# Patient Record
Sex: FEMALE | Race: WHITE | NOT HISPANIC OR LATINO | URBAN - METROPOLITAN AREA
[De-identification: names, ages, dates, MRNs, and addresses within clinical notes are randomized per-mention and may not be internally consistent; named-entity substitution may affect disease eponyms.]

---

## 2017-05-16 ENCOUNTER — ALLSCRIPTS OFFICE VISIT (OUTPATIENT)
Dept: OTHER | Facility: OTHER | Age: 27
End: 2017-05-16

## 2017-05-16 PROCEDURE — G0145 SCR C/V CYTO,THINLAYER,RESCR: HCPCS | Performed by: OBSTETRICS & GYNECOLOGY

## 2017-05-17 ENCOUNTER — LAB REQUISITION (OUTPATIENT)
Dept: LAB | Facility: HOSPITAL | Age: 27
End: 2017-05-17
Payer: COMMERCIAL

## 2017-05-17 DIAGNOSIS — Z01.419 ENCOUNTER FOR GYNECOLOGICAL EXAMINATION WITHOUT ABNORMAL FINDING: ICD-10-CM

## 2017-05-22 LAB
LAB AP GYN PRIMARY INTERPRETATION: NORMAL
LAB AP LMP: NORMAL
Lab: NORMAL

## 2018-01-13 VITALS
DIASTOLIC BLOOD PRESSURE: 78 MMHG | HEIGHT: 65 IN | WEIGHT: 195.13 LBS | BODY MASS INDEX: 32.51 KG/M2 | SYSTOLIC BLOOD PRESSURE: 112 MMHG

## 2018-05-17 DIAGNOSIS — Z30.41 ENCOUNTER FOR SURVEILLANCE OF CONTRACEPTIVE PILLS: Primary | ICD-10-CM

## 2018-05-17 RX ORDER — DROSPIRENONE AND ETHINYL ESTRADIOL 0.02-3(28)
1 KIT ORAL DAILY
COMMUNITY
Start: 2014-05-05 | End: 2018-05-17 | Stop reason: SDUPTHER

## 2018-05-18 RX ORDER — DROSPIRENONE AND ETHINYL ESTRADIOL 0.02-3(28)
1 KIT ORAL DAILY
Qty: 28 TABLET | Refills: 0 | Status: SHIPPED | OUTPATIENT
Start: 2018-05-18 | End: 2018-06-14 | Stop reason: SDUPTHER

## 2018-06-14 ENCOUNTER — ANNUAL EXAM (OUTPATIENT)
Dept: GYNECOLOGY | Facility: CLINIC | Age: 28
End: 2018-06-14
Payer: COMMERCIAL

## 2018-06-14 VITALS
WEIGHT: 192 LBS | HEIGHT: 64 IN | DIASTOLIC BLOOD PRESSURE: 78 MMHG | BODY MASS INDEX: 32.78 KG/M2 | SYSTOLIC BLOOD PRESSURE: 120 MMHG

## 2018-06-14 DIAGNOSIS — Z30.41 ENCOUNTER FOR SURVEILLANCE OF CONTRACEPTIVE PILLS: ICD-10-CM

## 2018-06-14 DIAGNOSIS — Z12.4 ENCOUNTER FOR PAPANICOLAOU SMEAR FOR CERVICAL CANCER SCREENING: ICD-10-CM

## 2018-06-14 DIAGNOSIS — Z01.419 ENCOUNTER FOR GYNECOLOGICAL EXAMINATION WITHOUT ABNORMAL FINDING: Primary | ICD-10-CM

## 2018-06-14 PROCEDURE — G0145 SCR C/V CYTO,THINLAYER,RESCR: HCPCS | Performed by: OBSTETRICS & GYNECOLOGY

## 2018-06-14 PROCEDURE — 99395 PREV VISIT EST AGE 18-39: CPT | Performed by: OBSTETRICS & GYNECOLOGY

## 2018-06-14 RX ORDER — DROSPIRENONE AND ETHINYL ESTRADIOL 0.02-3(28)
1 KIT ORAL DAILY
Qty: 84 TABLET | Refills: 3 | Status: SHIPPED | OUTPATIENT
Start: 2018-06-14 | End: 2018-08-22 | Stop reason: SDUPTHER

## 2018-06-14 NOTE — PROGRESS NOTES
Assessment/Plan:    Normal gyn exam     Diagnoses and all orders for this visit:    Encounter for gynecological examination without abnormal finding    Encounter for surveillance of contraceptive pills  -     drospirenone-ethinyl estradiol (Nicholas Hem) 3-0 02 MG per tablet; Take 1 tablet by mouth daily for 84 days        Subjective:      Patient ID: Ramesh Garcia is a 32 y o  female  HPI    The following portions of the patient's history were reviewed and updated as appropriate: allergies, current medications, past family history, past medical history, past social history, past surgical history and problem list     Review of Systems   Constitutional: Negative  Gastrointestinal: Negative  Genitourinary: Negative  Objective:      /78 (BP Location: Right arm)   Ht 5' 3 98" (1 625 m)   Wt 87 1 kg (192 lb)   LMP 05/20/2018 (Approximate)   BMI 32 98 kg/m²          Physical Exam   Constitutional: She appears well-developed and well-nourished  Neck: Normal range of motion  Neck supple  No thyromegaly present  Cardiovascular: Normal rate, regular rhythm and normal heart sounds  Pulmonary/Chest: Effort normal and breath sounds normal  Right breast exhibits no inverted nipple, no mass, no nipple discharge, no skin change and no tenderness  Left breast exhibits no inverted nipple, no mass, no nipple discharge, no skin change and no tenderness  Abdominal: Soft  Bowel sounds are normal  She exhibits no distension and no mass  There is no tenderness  Hernia confirmed negative in the right inguinal area and confirmed negative in the left inguinal area  Genitourinary: Vagina normal  There is no rash or lesion on the right labia  There is no rash or lesion on the left labia  Uterus is not deviated, not enlarged, not fixed and not tender  Cervix exhibits no motion tenderness, no discharge and no friability  Right adnexum displays no mass, no tenderness and no fullness   Left adnexum displays no mass, no tenderness and no fullness  Lymphadenopathy:        Right: No inguinal adenopathy present  Left: No inguinal adenopathy present

## 2018-06-19 LAB
LAB AP GYN PRIMARY INTERPRETATION: NORMAL
Lab: NORMAL

## 2018-08-22 ENCOUNTER — TELEPHONE (OUTPATIENT)
Dept: GYNECOLOGY | Facility: CLINIC | Age: 28
End: 2018-08-22

## 2018-08-22 DIAGNOSIS — Z30.41 ENCOUNTER FOR SURVEILLANCE OF CONTRACEPTIVE PILLS: ICD-10-CM

## 2018-08-22 RX ORDER — DROSPIRENONE AND ETHINYL ESTRADIOL 0.02-3(28)
1 KIT ORAL DAILY
Qty: 84 TABLET | Refills: 0 | Status: SHIPPED | OUTPATIENT
Start: 2018-08-22 | End: 2019-05-24 | Stop reason: SDUPTHER

## 2019-05-24 DIAGNOSIS — Z30.41 ENCOUNTER FOR SURVEILLANCE OF CONTRACEPTIVE PILLS: ICD-10-CM

## 2019-05-27 RX ORDER — DROSPIRENONE AND ETHINYL ESTRADIOL 0.02-3(28)
1 KIT ORAL DAILY
Qty: 28 TABLET | Refills: 0 | Status: SHIPPED | OUTPATIENT
Start: 2019-05-27 | End: 2019-06-28 | Stop reason: SDUPTHER

## 2019-06-28 ENCOUNTER — ANNUAL EXAM (OUTPATIENT)
Dept: GYNECOLOGY | Facility: CLINIC | Age: 29
End: 2019-06-28
Payer: COMMERCIAL

## 2019-06-28 VITALS
HEART RATE: 118 BPM | WEIGHT: 200 LBS | HEIGHT: 64 IN | SYSTOLIC BLOOD PRESSURE: 118 MMHG | DIASTOLIC BLOOD PRESSURE: 80 MMHG | BODY MASS INDEX: 34.15 KG/M2

## 2019-06-28 DIAGNOSIS — Z01.419 ENCOUNTER FOR GYNECOLOGICAL EXAMINATION WITHOUT ABNORMAL FINDING: Primary | ICD-10-CM

## 2019-06-28 DIAGNOSIS — Z30.41 ENCOUNTER FOR SURVEILLANCE OF CONTRACEPTIVE PILLS: ICD-10-CM

## 2019-06-28 DIAGNOSIS — Z01.419 ENCOUNTER FOR GYNECOLOGICAL EXAMINATION WITH PAPANICOLAOU SMEAR OF CERVIX: ICD-10-CM

## 2019-06-28 PROCEDURE — S0612 ANNUAL GYNECOLOGICAL EXAMINA: HCPCS | Performed by: OBSTETRICS & GYNECOLOGY

## 2019-06-28 PROCEDURE — G0145 SCR C/V CYTO,THINLAYER,RESCR: HCPCS | Performed by: OBSTETRICS & GYNECOLOGY

## 2019-06-28 RX ORDER — DROSPIRENONE AND ETHINYL ESTRADIOL 0.02-3(28)
1 KIT ORAL DAILY
Qty: 28 TABLET | Refills: 0 | Status: SHIPPED | OUTPATIENT
Start: 2019-06-28 | End: 2019-09-13 | Stop reason: SDUPTHER

## 2019-07-05 LAB
LAB AP GYN PRIMARY INTERPRETATION: NORMAL
Lab: NORMAL

## 2019-09-12 ENCOUNTER — TELEPHONE (OUTPATIENT)
Dept: GYNECOLOGY | Facility: CLINIC | Age: 29
End: 2019-09-12

## 2019-09-12 NOTE — TELEPHONE ENCOUNTER
Patient called needing refill of Gianvi sent to [facility] in [place]  Phone number for pharmacy is [phone]  FYI - this is a new pharmacy for her

## 2019-09-13 DIAGNOSIS — Z30.41 ENCOUNTER FOR SURVEILLANCE OF CONTRACEPTIVE PILLS: ICD-10-CM

## 2019-09-13 RX ORDER — DROSPIRENONE AND ETHINYL ESTRADIOL 0.02-3(28)
1 KIT ORAL DAILY
Qty: 28 TABLET | Refills: 11 | Status: SHIPPED | OUTPATIENT
Start: 2019-09-13 | End: 2019-12-13 | Stop reason: SDUPTHER

## 2019-11-21 NOTE — TELEPHONE ENCOUNTER
Pt got a generic form of Bearl Mike last month that she is having a reactiont to she started it 2nd week in October started with rash 2 weeks later with rash on chest and breast area then  By last week it has spread all over her body has been on Medrol dose pack with no relief no SOB of thightness in her throat or any signs of hives just an itchy rash told her stop the generic and go back omn the brand name  Called the pharmacy  Called in the brand name for her to start tonight  They will make note of reaction she had,     This nikkie second pack of this generic will call or go to ER if symptoms get worse

## 2019-12-13 DIAGNOSIS — Z30.41 ENCOUNTER FOR SURVEILLANCE OF CONTRACEPTIVE PILLS: ICD-10-CM

## 2019-12-13 RX ORDER — DROSPIRENONE AND ETHINYL ESTRADIOL 0.02-3(28)
1 KIT ORAL DAILY
Qty: 28 TABLET | Refills: 10 | Status: SHIPPED | OUTPATIENT
Start: 2019-12-13 | End: 2020-10-12

## 2020-10-12 DIAGNOSIS — Z30.41 ENCOUNTER FOR SURVEILLANCE OF CONTRACEPTIVE PILLS: ICD-10-CM

## 2020-10-12 RX ORDER — DROSPIRENONE AND ETHINYL ESTRADIOL 0.02-3(28)
KIT ORAL
Qty: 28 TABLET | Refills: 0 | Status: SHIPPED | OUTPATIENT
Start: 2020-10-12

## 2020-10-13 ENCOUNTER — TELEPHONE (OUTPATIENT)
Dept: GYNECOLOGY | Facility: CLINIC | Age: 30
End: 2020-10-13

## 2023-07-25 ENCOUNTER — APPOINTMENT (OUTPATIENT)
Dept: RADIOLOGY | Facility: CLINIC | Age: 33
End: 2023-07-25
Payer: COMMERCIAL

## 2023-07-25 ENCOUNTER — OFFICE VISIT (OUTPATIENT)
Dept: URGENT CARE | Facility: CLINIC | Age: 33
End: 2023-07-25
Payer: COMMERCIAL

## 2023-07-25 VITALS
OXYGEN SATURATION: 99 % | RESPIRATION RATE: 16 BRPM | SYSTOLIC BLOOD PRESSURE: 110 MMHG | HEIGHT: 63 IN | DIASTOLIC BLOOD PRESSURE: 64 MMHG | HEART RATE: 108 BPM | WEIGHT: 218 LBS | BODY MASS INDEX: 38.62 KG/M2 | TEMPERATURE: 99.8 F

## 2023-07-25 DIAGNOSIS — S99.922A INJURY OF LEFT FOOT, INITIAL ENCOUNTER: ICD-10-CM

## 2023-07-25 DIAGNOSIS — S96.912A MUSCLE STRAIN OF LEFT FOOT, INITIAL ENCOUNTER: Primary | ICD-10-CM

## 2023-07-25 PROCEDURE — 99213 OFFICE O/P EST LOW 20 MIN: CPT

## 2023-07-25 PROCEDURE — 73630 X-RAY EXAM OF FOOT: CPT

## 2023-07-25 NOTE — PROGRESS NOTES
[facility]        NAME: Izaiah Lennon is a 28 y.o. female  : 1990    MRN: [id number]  DATE: 2023  TIME: 12:41 PM    Assessment and Plan   Muscle strain of left foot, initial encounter [S96.912A]  1. Muscle strain of left foot, initial encounter        2. Injury of left foot, initial encounter  XR foot 3+ vw left            Patient Instructions     Your x-rays were read by the provider. A radiologist will also read the x-rays and you will be notified of any abnormalities. Continue Advil/Motrin/Tylenol for pain relief. Heat and then ice applications as discussed. Supportive footwear. Follow-up with your PCP as needed. Go to the ED for any severely worsening symptoms. Chief Complaint     Chief Complaint   Patient presents with   • Foot Injury     Pt reports left foot pain resulting from injury that occurred one week ago while wearing flip flops and miss stepping. C/o pain with weightbearing. Managing symptoms with ice application. History of Present Illness       This is a 43-year-old female who presents with left foot pain that started 8 days ago after slipping down a few stairs. Patient states she was wearing strappy sandals and thinks she curled her toes to try and  with her foot to prevent fall. Over the past 1 week she's noticed a tight sore pain in the ball of her foot with weight-bearing. She notes improvement with wearing sneakers. Denies numbness, tingling, and weakness. She did apply ice and has been taking Motrin. Denies prior history of injuries to the foot. Review of Systems   Review of Systems   Constitutional: Negative for chills and fever. Respiratory: Negative for chest tightness and shortness of breath. Cardiovascular: Negative for chest pain and palpitations. Musculoskeletal: Positive for arthralgias. Negative for myalgias. Skin: Negative for wound.        Current Medications       Current Outpatient Medications:   • Lo-Zumandimine 3-0.02 MG per tablet, TAKE 1 TABLET BY MOUTH ONCE DAILY FOR 28 DAYS (Patient not taking: Reported on 7/25/2023), Disp: 28 tablet, Rfl: 0    Current Allergies     Allergies as of 07/25/2023   • (No Known Allergies)            The following portions of the patient's history were reviewed and updated as appropriate: allergies, current medications, past family history, past medical history, past social history, past surgical history and problem list.     History reviewed. No pertinent past medical history. Past Surgical History:   Procedure Laterality Date   • CHOLECYSTECTOMY         History reviewed. No pertinent family history. Medications have been verified. Objective   /64 (BP Location: Right arm, Patient Position: Sitting)   Pulse (!) 108   Temp 99.8 °F (37.7 °C)   Resp 16   Ht 5' 3" (1.6 m)   Wt 98.9 kg (218 lb)   SpO2 99%   BMI 38.62 kg/m²        Physical Exam     Physical Exam  Vitals and nursing note reviewed. Constitutional:       General: She is not in acute distress. HENT:      Head: Normocephalic. Right Ear: External ear normal.      Left Ear: External ear normal.      Nose: Nose normal.      Mouth/Throat:      Mouth: Mucous membranes are moist.   Eyes:      Conjunctiva/sclera: Conjunctivae normal.   Cardiovascular:      Rate and Rhythm: Normal rate. Pulses: Normal pulses. Pulmonary:      Effort: Pulmonary effort is normal.   Musculoskeletal:         General: Normal range of motion. Cervical back: Normal range of motion and neck supple. Feet:    Feet:      Left foot:      Skin integrity: Skin integrity normal.   Skin:     General: Skin is warm and dry. Capillary Refill: Capillary refill takes less than 2 seconds. Neurological:      Mental Status: She is alert and oriented to person, place, and time. Gait: Gait abnormal (mild limping gait, presents wearing flip flops).

## 2023-07-26 NOTE — PATIENT INSTRUCTIONS
Your x-rays were read by the provider. A radiologist will also read the x-rays and you will be notified of any abnormalities. Continue Advil/Motrin/Tylenol for pain relief. Heat and then ice applications as discussed. Supportive footwear. Follow-up with your PCP as needed. Go to the ED for any severely worsening symptoms.

## 2024-05-15 ENCOUNTER — TELEPHONE (OUTPATIENT)
Facility: HOSPITAL | Age: 34
End: 2024-05-15

## 2024-05-15 ENCOUNTER — TRANSCRIBE ORDERS (OUTPATIENT)
Facility: HOSPITAL | Age: 34
End: 2024-05-15

## 2024-05-15 DIAGNOSIS — O09.899 SUPERVISION OF OTHER HIGH RISK PREGNANCY, ANTEPARTUM, UNSPECIFIED TRIMESTER: Primary | ICD-10-CM

## 2024-05-15 NOTE — TELEPHONE ENCOUNTER
Called patient to schedule MFM appointment, based on referral issued to Maternal Fetal Medicine by OB office.    Left voicemail requesting patient to call back and schedule appointment, with office number for return call [phone].

## 2024-06-21 ENCOUNTER — ROUTINE PRENATAL (OUTPATIENT)
Dept: PERINATAL CARE | Facility: OTHER | Age: 34
End: 2024-06-21
Payer: COMMERCIAL

## 2024-06-21 VITALS
BODY MASS INDEX: 40.47 KG/M2 | DIASTOLIC BLOOD PRESSURE: 62 MMHG | SYSTOLIC BLOOD PRESSURE: 118 MMHG | HEIGHT: 63 IN | HEART RATE: 104 BPM | WEIGHT: 228.4 LBS

## 2024-06-21 DIAGNOSIS — Z36.3 ENCOUNTER FOR ANTENATAL SCREENING FOR MALFORMATION USING ULTRASOUND: ICD-10-CM

## 2024-06-21 DIAGNOSIS — O09.292 H/O GESTATIONAL DIABETES IN PRIOR PREGNANCY, CURRENTLY PREGNANT, SECOND TRIMESTER: ICD-10-CM

## 2024-06-21 DIAGNOSIS — Z86.32 H/O GESTATIONAL DIABETES IN PRIOR PREGNANCY, CURRENTLY PREGNANT, SECOND TRIMESTER: ICD-10-CM

## 2024-06-21 DIAGNOSIS — O99.210 SEVERE OBESITY DUE TO EXCESS CALORIES AFFECTING PREGNANCY, ANTEPARTUM (HCC): Primary | ICD-10-CM

## 2024-06-21 DIAGNOSIS — O09.899 SUPERVISION OF OTHER HIGH RISK PREGNANCY, ANTEPARTUM, UNSPECIFIED TRIMESTER: ICD-10-CM

## 2024-06-21 DIAGNOSIS — Z3A.16 16 WEEKS GESTATION OF PREGNANCY: ICD-10-CM

## 2024-06-21 DIAGNOSIS — E66.01 SEVERE OBESITY DUE TO EXCESS CALORIES AFFECTING PREGNANCY, ANTEPARTUM (HCC): Primary | ICD-10-CM

## 2024-06-21 PROCEDURE — 76805 OB US >/= 14 WKS SNGL FETUS: CPT | Performed by: OBSTETRICS & GYNECOLOGY

## 2024-06-21 PROCEDURE — 99203 OFFICE O/P NEW LOW 30 MIN: CPT | Performed by: OBSTETRICS & GYNECOLOGY

## 2024-06-21 RX ORDER — ASPIRIN 81 MG/1
162 TABLET, CHEWABLE ORAL DAILY
Qty: 180 TABLET | Refills: 1 | Status: SHIPPED | OUTPATIENT
Start: 2024-06-21 | End: 2024-06-21

## 2024-06-21 RX ORDER — MAGNESIUM 30 MG
30 TABLET ORAL DAILY
COMMUNITY

## 2024-06-21 NOTE — PROGRESS NOTES
Brittney presents today for a early anatomic evaluation.  This is her second pregnancy.  First pregnancy was complicated by A2 gestational diabetes.  She has obesity with a current BMI of 40.  There is a family history of hypertension in her father.    The patient had noninvasive prenatal testing through [facility] using the [id number] plus test.  Her results were normal, placing her in a very low risk category.  The sensitivity of detecting Trisomy 21 with this test is 99.1% with a specificity of 99.9%.  The sensitivity of detecting Trisomy 18 is >99.9% with a specificity of 99.6%.  The sensitivity of detecting Trisomy 13 is 91.7% with a specificity of 99.7%.  Her negative result is very reassuring that the likelihood of her having a fetus with the aforementioned Trisomies is very low.    Today's early anatomic evaluation is overall reassuring without concerns for an obvious congenital birth defect.  We discussed the limitations of evaluating fetal anatomy at this gestational age.    We reviewed the recommendation to rescreen for gestational diabetes at around 26 to 28 weeks gestation.    We discussed follow-up in detail and I recommend she return in 5 weeks for a level 2 ultrasound and cervical length evaluation.    Thank you very much for allowing us to participate in the care of this very nice patient.  Should you have any questions, please do not hesitate to contact our office.

## 2024-08-01 PROBLEM — K80.20 GALLSTONES WITHOUT OBSTRUCTION OF GALLBLADDER: Status: RESOLVED | Noted: 2017-02-22 | Resolved: 2024-08-01

## 2024-08-01 PROBLEM — O24.415 GESTATIONAL DIABETES MELLITUS (GDM) CONTROLLED ON ORAL HYPOGLYCEMIC DRUG, ANTEPARTUM: Status: RESOLVED | Noted: 2022-02-16 | Resolved: 2024-08-01

## 2024-08-02 ENCOUNTER — ROUTINE PRENATAL (OUTPATIENT)
Dept: PERINATAL CARE | Facility: OTHER | Age: 34
End: 2024-08-02
Payer: COMMERCIAL

## 2024-08-02 VITALS
WEIGHT: 237.2 LBS | HEART RATE: 118 BPM | BODY MASS INDEX: 42.03 KG/M2 | SYSTOLIC BLOOD PRESSURE: 128 MMHG | DIASTOLIC BLOOD PRESSURE: 66 MMHG | HEIGHT: 63 IN

## 2024-08-02 DIAGNOSIS — Z86.32 H/O GESTATIONAL DIABETES IN PRIOR PREGNANCY, CURRENTLY PREGNANT, SECOND TRIMESTER: ICD-10-CM

## 2024-08-02 DIAGNOSIS — E66.01 SEVERE OBESITY DUE TO EXCESS CALORIES AFFECTING PREGNANCY, ANTEPARTUM (HCC): Primary | ICD-10-CM

## 2024-08-02 DIAGNOSIS — O09.292 H/O GESTATIONAL DIABETES IN PRIOR PREGNANCY, CURRENTLY PREGNANT, SECOND TRIMESTER: ICD-10-CM

## 2024-08-02 DIAGNOSIS — Z36.86 ENCOUNTER FOR ANTENATAL SCREENING FOR CERVICAL LENGTH: ICD-10-CM

## 2024-08-02 DIAGNOSIS — O26.892 HEADACHE IN PREGNANCY, ANTEPARTUM, SECOND TRIMESTER: ICD-10-CM

## 2024-08-02 DIAGNOSIS — R51.9 HEADACHE IN PREGNANCY, ANTEPARTUM, SECOND TRIMESTER: ICD-10-CM

## 2024-08-02 DIAGNOSIS — Z3A.22 22 WEEKS GESTATION OF PREGNANCY: ICD-10-CM

## 2024-08-02 DIAGNOSIS — Z36.3 ENCOUNTER FOR ANTENATAL SCREENING FOR MALFORMATIONS: ICD-10-CM

## 2024-08-02 DIAGNOSIS — O99.210 SEVERE OBESITY DUE TO EXCESS CALORIES AFFECTING PREGNANCY, ANTEPARTUM (HCC): Primary | ICD-10-CM

## 2024-08-02 PROCEDURE — 76817 TRANSVAGINAL US OBSTETRIC: CPT | Performed by: OBSTETRICS & GYNECOLOGY

## 2024-08-02 PROCEDURE — 76811 OB US DETAILED SNGL FETUS: CPT | Performed by: OBSTETRICS & GYNECOLOGY

## 2024-08-02 PROCEDURE — 99213 OFFICE O/P EST LOW 20 MIN: CPT | Performed by: OBSTETRICS & GYNECOLOGY

## 2024-08-02 NOTE — PROGRESS NOTES
"[facility]: Ms. Cisse was seen today for anatomic survey and cervical length screening ultrasound.  See ultrasound report under \"OB Procedures\" tab.   The time spent on this established patient on the encounter date included 5 minutes previsit service time reviewing records and precharting, 10 minutes face-to-face service time counseling regarding results and coordinating care, and  5 minutes charting, totalling 20 minutes.  Please don't hesitate to contact our office with any concerns or questions.  -Ca Isidro MD    "

## 2024-08-02 NOTE — LETTER
2024    Cheri Gamez MD  [address]  [place] [address]    Patient: Brittney Cisse   YOB: 1990   Date of Visit: 2024   Nature of this communication: Routine though please note I referred her to neurology for headaches     This patient was seen recently in our  office.  Consultation is contained in body of ultrasound report which has been faxed to you under separate cover; please contact us if you do not receive this.    Please don't hesitate to contact our office with any concerns or questions.     Sincerely,      Ca Isidro MD  Attending Physician, Maternal-Fetal Medicine  [facility]

## 2024-09-11 ENCOUNTER — TELEMEDICINE (OUTPATIENT)
Dept: PERINATAL CARE | Facility: CLINIC | Age: 34
End: 2024-09-11
Payer: COMMERCIAL

## 2024-09-11 DIAGNOSIS — Z86.32 HISTORY OF INSULIN CONTROLLED GESTATIONAL DIABETES MELLITUS (GDM): ICD-10-CM

## 2024-09-11 DIAGNOSIS — Z3A.28 28 WEEKS GESTATION OF PREGNANCY: ICD-10-CM

## 2024-09-11 DIAGNOSIS — O24.419 GESTATIONAL DIABETES MELLITUS (GDM) IN THIRD TRIMESTER, GESTATIONAL DIABETES METHOD OF CONTROL UNSPECIFIED: Primary | ICD-10-CM

## 2024-09-11 PROCEDURE — G0108 DIAB MANAGE TRN  PER INDIV: HCPCS

## 2024-09-11 RX ORDER — BLOOD-GLUCOSE METER
EACH MISCELLANEOUS
Qty: 1 KIT | Refills: 0 | Status: SHIPPED | OUTPATIENT
Start: 2024-09-11 | End: 2024-12-01

## 2024-09-11 RX ORDER — LANCETS
EACH MISCELLANEOUS
Qty: 100 EACH | Refills: 5 | Status: SHIPPED | OUTPATIENT
Start: 2024-09-11 | End: 2024-12-01

## 2024-09-11 NOTE — PROGRESS NOTES
"CLASS 1 - Individual  (virtual visit)    Thank you for referring your patient to [facility] Maternal Fetal Medicine Diabetes and Pregnancy Program.     Subjective:     Brittney Cisse is a 33 y.o. female who presents today unaccompanied for Virtual Regular Visit. Patient is at 28w3d gestation, Estimated Date of Delivery: 24.     Learns best by: Visual (Images/Graphics, Videos, PowerPoint Presentation)  Primary Support Person: Spouse  Sanna with stress by: Walking  How do you feel the diabetes diagnosis will affect the rest of your pregnancy? Answer: \"I saw it coming. It's not ideal and I'm a little disappointed.\"     Reviewed and updated the following from patients medical record: Demographics, Education, Occupation, PMH, Problem List, Allergies, and Current Medications. D&P Assessment responses have been reported throughout the following note.    Visit Diagnosis:  Encounter Diagnosis     ICD-10-CM    1. Gestational diabetes mellitus (GDM) in third trimester, gestational diabetes method of control unspecified  O24.419 Mychart glucose flowsheet      2. 28 weeks gestation of pregnancy  Z3A.28 Mychart glucose flowsheet      3. History of insulin controlled gestational diabetes mellitus (GDM)  Z86.32 Mychart glucose flowsheet           Discussed with patient:   - Pathophysiology of Gestational diabetes mellitus (GDM) in third trimester, gestational diabetes method of control unspecified [O24.419].  - Untreated hyperglycemia in pregnancy and maternal fetal complications (fetal macrosomia,  hypoglycemia, polyhydramnios, increased incidence of  section,  labor, and in severe cases fetal demise and still birth).   - Importance of blood glucose monitoring, nutrition, and medication if necessary in achieving BG goals.     HPI  Personal History of GDM? Yes, GDM metformin + insulin  Family History of Diabetes? No     Family History   Problem Relation Age of Onset    Gestational diabetes Mother      " "   + Pre-DM    Hypertension Father     Asthma Brother     Diabetes type II Maternal Grandfather     No Known Problems Daughter         Labs    No results found for: \"VNV8APID21MC\"   No results found for: \"GLUF\", \"QFNMSFI0KY\", \"XCGPLTV2AM\", \"TMAZXCC0RZ\"   No results found for: \"HGBA1C\"     Ordered This Visit: None    Current Outpatient Medications  Diabetic Medications: None      Current Outpatient Medications:     Acetaminophen (TYLENOL PO), Take by mouth, Disp: , Rfl:     ASPIRIN 81 PO, Take 2 tablets by mouth in the morning, Disp: , Rfl:     Prenatal Vit-Fe Fumarate-FA (PRENATAL VITAMIN PO), Take 1 tablet by mouth in the morning, Disp: , Rfl:     magnesium 30 MG tablet, Take 30 mg by mouth in the morning (Patient not taking: Reported on 8/2/2024), Disp: , Rfl:     RIBOFLAVIN PO, Take 1 tablet by mouth in the morning (Patient not taking: Reported on 8/2/2024), Disp: , Rfl:      Anthropometrics:    Pre-Pregnancy:   Pre-Gravid Wt: 102 kg (224 lb)  Pre-Gravid BMI: 39.69    Current:  Ht Readings from Last 1 Encounters:   08/02/24 5' 3\" (1.6 m)      Wt Readings from Last 3 Encounters:   08/02/24 108 kg (237 lb 3.2 oz)   06/21/24 104 kg (228 lb 6.4 oz)   07/25/23 98.9 kg (218 lb)      Current BMI: There is no height or weight on file to calculate BMI.    Weight Gain in Pregnancy:  Current TWG (5.987 kg (13 lb 3.2 oz)) compared to recommended TWG (5 kg (11 lb)-9 kg (19 lb)): Exceeding -- Recommended to maintain wt for remainder of pregnancy    Most Recent Ultrasound Results:  Findings: NML Growth/BHUPENDRA  Addit. Fetal Surveillance: None  Next US date: Scheduled Appropriately    Blood Glucose Monitoring:   Ordered Glucose Meter: Contour Next EZ   Meter Teaching: Gave instruction on site selection, skin preparation, loading strips and lancet device, meter activation, obtaining blood sample, test strip and lancet disposal and storage, and recording log book entries.     BG Monitoring Recommendations:  Frequency: 4 x per day " "(Fasting, 2 hour after start of each meal)  Goals: (Fasting) 60-95mg/dL // (2hr PP) <120mg/dL  Reporting: Weekly via [employer] Glucose Flowsheet     Meal Plan:     Review of Patient's Current Diet:  Type of Diet: Regular   Special or ethnic dietary preferences? no  Food Allergies: None  Food Intolerances:  Certain dairy products  Food Dislikes: None  Receiving WIC or food stamps? No    Typical Timing/Frequency of Meals and Snacks:   # Meals Daily: 3  # Snacks Daily?  In between each meal    Meal Plan Recommendations Reviewed:  Daily Calories/Carbohydrate/Protein: 2000 calorie (CHO: 45-15-60-15-60-30) (PRO: 2/3-1-3/4-1-3/4-2)   Appropriate amounts of CHO, PRO, and Fat at each meal and snack.   CHO exchange list, and portion sizes for both CHO and PRO  How to read a food label  Suggested meal/snack options to increase nutrition and maintain consistent meal and snack intakes  Eat every 2.0-3.5 hours while awake  Go no longer than 8-10 hours fasting overnight until first meal of the day.     Physical Activity:  Currently physically active?  Walk after dinner; Chasing after toddler    Physical Activity Recommendations Reviewed:   Benefits of physical activity to optimize blood glucose control, encouraged activity at patient is physically able.   Instructed pt to always consult a physician prior to starting an exercise program.   Recommend 20-30 minutes daily.     Patient Goal: \"I will eat 3 meals and 3 snacks each day, including protein at each\"  Expected Compliance: good  Barriers to Learning/Change: No Barriers  Diabetes Self Management Support Plan (outside of ongoing care): Spouse/Family     Date to Report Blood Sugars: Day Before Class 2, Then Weekly (till delivery)    Class 2: Return in 1 week (on 9/18/2024) for Class 2 w/ Wendy Caceres RD.   *Patient instructed to bring 3 day food diary and/or write down foods associated with elevated after meal blood sugars    Begin Time: 10:00am    End Time: 11:00am    It was a " "pleasure working with them today. Please feel free to call ([phone]) with any questions or concerns.    Wendy Caceres RD   Diabetes Educator  [facility] Maternal Fetal Medicine  Diabetes and Pregnancy Program  [address], [address]  [place] [address]    Virtual Regular Visit  Name: Brittney Cisse      : 1990      MRN: [id number]  Encounter Provider: Wendy Caceres RD  Encounter Date: 2024   Encounter department: [facility]    Verification of patient location:    Patient is located at Home in the following state in which I hold an active license [place]    Assessment & Plan  Gestational diabetes mellitus (GDM) in third trimester, gestational diabetes method of control unspecified    No results found for: \"HGBA1C\"  Orders:    Mychart glucose flowsheet    28 weeks gestation of pregnancy    Orders:    Mychart glucose flowsheet    History of insulin controlled gestational diabetes mellitus (GDM)    Orders:    Mychart glucose flowsheet        Encounter provider Wendy Caceres RD    The patient was identified by name and date of birth. Brittney Cisse was informed that this is a telemedicine visit and that the visit is being conducted through the Epic Embedded platform. She agrees to proceed..  My office door was closed. No one else was in the room.  She acknowledged consent and understanding of privacy and security of the video platform. The patient has agreed to participate and understands they can discontinue the visit at any time.    Patient is aware this is a billable service.     History of Present Illness     Brittney Cisse is a 33 y.o. female who presents pregnant.      Review of Systems        Objective     LMP 2024   Physical Exam    Visit Time  Total Visit Duration: 60min        "

## 2024-09-11 NOTE — PATIENT INSTRUCTIONS
CLASS 1  Diabetes and Pregnancy Program   Formerly [facility] - Maternal Fetal Medicine    Diabetes Team:   AURA Chaidez CDE   Stephanie Echeverria RD CD (Judy)E MS Nguyen (Candice) DEWEY Caceres MS, OLY Woo RD CDE MPH    Resource: Gestational Diabetes  ACOG     CHECKING BLOOD SUGARS    Always carry your meter and testing supplies with you at all times. Bring your glucose meter to all your appointments in our [facility] office.     Prescription for Meter/Strips/Lancets:   A request for a glucose meter, test strips and lancets was sent to the provider for approval.   Once your prescriptions are approved by the provider, your prescription will be sent electronically to your pharmacy.   Be mindful the provider is seeing patients in the office today so allow ample time for your prescriptions to be approved.   Call your pharmacy to verify your medication was received electronically and is ready for pick-up before going to pharmacy. If you have any issues with coverage or your meter is unavailable, please reach out to our office at [phone].     Coupons/Savings:   One Touch Verio: RX Finder  Automatic Savings for Diabetes Supplies  OneTouch®   Contour Next: Contour® NEXT Test Strips Discounts  [facility]     How to Check Blood Sugars:  Wash your hands with soap and water or use waterless  to clean your hands.  Alcohol can dry your fingers and is not recommended.  Alcohol can also cause false, elevated glucose readings.  AVOID scented soaps and hand sanitizers - scented soaps may include sugar which can cause inaccurate/elevated readings   Gather your glucose meter kit and supplies.  Prepare your meter by inserting a new test strip.   This will automatically turn on your meter  Make sure test strips are not .  Use a new test strip each time.   Prepare your lancing device by inserting the lancet               After the lancet is securely in place, twist off  "the top to reveal needle.   Reattach lancing device top   Once lancing device top is reattached, you are now ready to prick the side of your fingertip to get a blood sample.  You can adjust the depth setting as needed. We recommend to start at \"4\".   Apply the blood sample to test strip according to instructions.   Make sure your sharp container is: heavy duty plastic, puncture-resistant lid, upright and stable, leak resistant, properly labeled.   Record your blood sugar     Additional References and Video:   One Touch Verio: OneTouch Verio Flex®  Blood Glucose Meter  OneTouch®   Contour Next: Instructional Videos - [facility]     Frequency: 4 Times Daily     Timing:   Fasting   Test when you wake up before you have anything to eat or drink  At least 8hrs but no more than 10hrs from your bedtime snack  Exceeding 10hrs may result in inaccurate readings  2 hrs after the first bite of your meal (breakfast, lunch, dinner) **do not test for snacks    Goals:    Fasting  60-95   1 Hour   After Meals <140   2 Hours   After Meals <120   *please inform member of diabetes team if you begin testing 1hr blood sugars    REPORTING BLOOD SUGARS:   Please only pick ONE way to report to our team. Choose the best option for you.     [employer] Blood Glucose Flow sheet under \"Track my Health\"   Remember to click \"save\" for your readings to automatically upload into Epic.   [employer] Message with Attachment(s)  Send a picture of a written blood sugar log   To: AURA Chaidez (Medical Question - Non Urgent)  You may include up to 3 images per message  Leave Voicemail at [phone]   Include: Name, Date of Birth, and Date + Blood Sugars    The diabetes team will review your blood sugar log weekly till delivery.             MEAL PLAN AND DIET INSTRUCTIONS    *Carbohydrates: Sources of food that increase your blood sugar (include: starches, fruits, milk, desserts, starchy vegetables such as corn, peas, squash)    Meal Plan " (3 Meals and 3 Snacks)  Outlines grams of carbohydrates to have at each meal and snack  Minimum Carbohydrate Intake Daily (avoid ketosis): 175g *to be distributed throughout day as instructed in meal plan  Include Protein at Every Meal and Snack  Eat all 3 meals and 3 snacks  Eating every 2 to 3.5 hours during the day.   Preferably at the same times every day.   Avoid going long periods of time without eating.        Be sure to have bedtime snack that includes at least 30 grams of carbohydrates and 2 ounces (14 grams) of protein.  Refer to Food Lists in Booklet (pages 15-17)   Each food listed is equal to 15g (1 Carbohydrate Serving)  Read Food Label   Check Total Carbohydrate  15g = 1 Carbohydrate Serving  Check Serving Size!   The total carbohydrate amount listed is based on 1 serving size  Be careful as many items contain more than one serving per package  Check Total Sugars  Choose items with <15g per serving of total sugar    Exercise:  If ok by your OB try adding a 20-30 minute walk after dinner to help keep fasting blood sugar within range.  Try incorporating at least 10 minutes of walking after each meal  Resource: Exercise During Pregnancy  ACOG     Additional Information: (to be reviewed at class 2)  Continue to follow up with your OB and MFM providers as recommended.  Always have glucose available for hypoglycemia, use 15 by 15 rule. (Page 29 in booklet)  While sick or feeling ill, follow our sick day guidelines in our GDM booklet. (Page 28 in booklet)  For travel and dining out tips refer to your GDM booklet. See attachment (Page 31 in booklet)    Class 2 Information: Approximately 1 week following Class 1  Bring 3 Day Food Log (everything you eat and drink for each meal and snack) or write down meals associated with elevated after meal blood sugars  Will review diet more in depth and provide feedback     Remember: Importance of maintaining tight control of blood sugars during pregnancy = decrease risk  factors including fetal macrosomia; birth injury; risk of ; polyhydramnios; pre-term labor; pre-eclampsia;  hypoglycemia; jaundice and stillbirth.    Any questions give us a call at [phone] or send us a MyChart message to AURA Chaidez.     Wendy Caceres RD  Diabetes Educator   The Diabetes and Pregnancy Program  At [facility] - Maternal Fetal Medicine

## 2024-09-12 ENCOUNTER — ULTRASOUND (OUTPATIENT)
Dept: PERINATAL CARE | Facility: OTHER | Age: 34
End: 2024-09-12
Payer: COMMERCIAL

## 2024-09-12 VITALS
WEIGHT: 243.8 LBS | SYSTOLIC BLOOD PRESSURE: 128 MMHG | HEIGHT: 63 IN | DIASTOLIC BLOOD PRESSURE: 68 MMHG | HEART RATE: 104 BPM | BODY MASS INDEX: 43.2 KG/M2

## 2024-09-12 DIAGNOSIS — Z3A.28 28 WEEKS GESTATION OF PREGNANCY: Primary | ICD-10-CM

## 2024-09-12 DIAGNOSIS — O24.410 DIET CONTROLLED GESTATIONAL DIABETES MELLITUS (GDM) IN THIRD TRIMESTER: ICD-10-CM

## 2024-09-12 DIAGNOSIS — O99.210 SEVERE OBESITY DUE TO EXCESS CALORIES AFFECTING PREGNANCY, ANTEPARTUM (HCC): ICD-10-CM

## 2024-09-12 DIAGNOSIS — E66.01 SEVERE OBESITY DUE TO EXCESS CALORIES AFFECTING PREGNANCY, ANTEPARTUM (HCC): ICD-10-CM

## 2024-09-12 PROCEDURE — 76816 OB US FOLLOW-UP PER FETUS: CPT | Performed by: OBSTETRICS & GYNECOLOGY

## 2024-09-12 PROCEDURE — 99214 OFFICE O/P EST MOD 30 MIN: CPT | Performed by: OBSTETRICS & GYNECOLOGY

## 2024-09-12 NOTE — PROGRESS NOTES
"[facility]: Brittney Cisse was seen today for fetal growth assessment ultrasound.  See ultrasound report under \"OB Procedures\" tab.   The time spent on this established patient on the encounter date included 5 minutes previsit service time reviewing records and precharting, 10 minutes face-to-face service time counseling regarding results and coordinating care, and  5 minutes charting, totalling 20 minutes.  Please don't hesitate to contact our office with any concerns or questions.  -Evi Rosas MD    "

## 2024-09-18 ENCOUNTER — TELEMEDICINE (OUTPATIENT)
Dept: PERINATAL CARE | Facility: CLINIC | Age: 34
End: 2024-09-18
Payer: COMMERCIAL

## 2024-09-18 DIAGNOSIS — Z3A.29 29 WEEKS GESTATION OF PREGNANCY: ICD-10-CM

## 2024-09-18 DIAGNOSIS — Z86.32 HISTORY OF INSULIN CONTROLLED GESTATIONAL DIABETES MELLITUS (GDM): ICD-10-CM

## 2024-09-18 DIAGNOSIS — O24.410 DIET CONTROLLED GESTATIONAL DIABETES MELLITUS (GDM) IN THIRD TRIMESTER: Primary | ICD-10-CM

## 2024-09-18 DIAGNOSIS — E66.01 SEVERE OBESITY DUE TO EXCESS CALORIES AFFECTING PREGNANCY, ANTEPARTUM (HCC): ICD-10-CM

## 2024-09-18 DIAGNOSIS — O99.210 SEVERE OBESITY DUE TO EXCESS CALORIES AFFECTING PREGNANCY, ANTEPARTUM (HCC): ICD-10-CM

## 2024-09-18 PROCEDURE — G0108 DIAB MANAGE TRN  PER INDIV: HCPCS

## 2024-09-18 NOTE — PROGRESS NOTES
"CLASS 2 - Individual  (virtual visit)    Thank you for referring your patient to [facility] Maternal Fetal Medicine Diabetes and Pregnancy Program.     Brittney Cisse is a  33 y.o. female who presents today unaccompanied for Virtual Regular Visit, Patient Education, and Gestational Diabetes.  Patient is at 29w3d gestation, Estimated Date of Delivery: 12/1/24.     Visit Diagnosis:  Encounter Diagnosis     ICD-10-CM    1. Diet controlled gestational diabetes mellitus (GDM) in third trimester  O24.410 Hemoglobin A1C     Comprehensive metabolic panel     Hemoglobin A1C     Comprehensive metabolic panel      2. 29 weeks gestation of pregnancy  Z3A.29 Hemoglobin A1C     Comprehensive metabolic panel     Hemoglobin A1C     Comprehensive metabolic panel      3. History of insulin controlled gestational diabetes mellitus (GDM)  Z86.32 Hemoglobin A1C     Comprehensive metabolic panel     Hemoglobin A1C     Comprehensive metabolic panel      4. Severe obesity due to excess calories affecting pregnancy, antepartum (HCC)  O99.210 Hemoglobin A1C    E66.01 Comprehensive metabolic panel     Hemoglobin A1C     Comprehensive metabolic panel           Reviewed and updated the following from patients medical record: PMH, Problem List, Allergies, and Current Medications.    Labs  GDM LABS: See Class 1 Note    A1C:  No results found for: \"HGBA1C\"     Labs Ordered This Visit: A1C and CMP d/t hyperglycemia     Current Medications:    Current Outpatient Medications:     Acetaminophen (TYLENOL PO), Take by mouth, Disp: , Rfl:     ASPIRIN 81 PO, Take 2 tablets by mouth in the morning, Disp: , Rfl:     Blood Glucose Monitoring Suppl (Contour Next EZ) w/Device KIT, Test x4 Daily or as instructed, Disp: 1 kit, Rfl: 0    Contour Next Test test strip, Test 4 Times Daily or as instructed, Disp: 100 strip, Rfl: 5    Microlet Lancets MISC, Use 4 a Day or as instructed, Disp: 100 each, Rfl: 5    Prenatal Vit-Fe Fumarate-FA (PRENATAL VITAMIN PO), " "Take 1 tablet by mouth in the morning, Disp: , Rfl:     magnesium 30 MG tablet, Take 30 mg by mouth in the morning (Patient not taking: Reported on 2024), Disp: , Rfl:     RIBOFLAVIN PO, Take 1 tablet by mouth in the morning (Patient not taking: Reported on 2024), Disp: , Rfl:      Anthropometrics:  Ht Readings from Last 1 Encounters:   24 5' 3\" (1.6 m)      Wt Readings from Last 3 Encounters:   24 111 kg (243 lb 12.8 oz)   24 108 kg (237 lb 3.2 oz)   24 104 kg (228 lb 6.4 oz)        Pre-Gravid Wt Pre-Gravid BMI TWG   102 kg (224 lb) 39.69 8.981 kg (19 lb 12.8 oz)     Total Pregnancy Weight Gain Recommendations: BMI (> 30) 11-20 lbs  Current Wt Status Compared to Recommendations: Exceeding -- Recommended to maintain wt for remainder of pregnancy    Most Recent Ultrasound Results:  Findings:  (24) AC: 96%, EFW: 96%, NML BHUPENDRA  Further Fetal Surveillance: None  Next US date: Scheduled Appropriately    BLOOD GLUCOSE MONITORING:   Glucometer: Contour Next EZ     Reinforced at Today's Visit:   Timing/Frequency of SMB x per day (Fasting, 2 hour after start of each meal)  Goals: (Fasting) 60-95mg/dL // (2hr PP) <120mg/dL  Reporting Guidelines: Weekly via Phone: [phone] OR My Chart (Message with image attachment) OR Glucose Flowsheet  Method of Reporting: [employer] Glucose Flowsheet    BG LOG:         Review of Blood Glucose Log:   FBG = Not well controlled  Post-Prandial BG =  Several elevated >120; Not consistently elevated  Pt reports she had a cold this past week starting last Friday which may have affected blood sugars    MEAL PLAN (Patient was provided with a meal plan including 3 meals and 3 snacks at class 1)  *Calories: 1800 calorie (CHO: 45-15-45-15-45-30) (PRO:2-1-3-1-3-2)    Review of Patient's Current Diet: refer to class 1 note for additional details  Beverages: No Sugar Sweetened Beverages    Meal Plan Recommendations Compliant? Comments:    Consistent CHO Intake No "  - Not CHO counting at this time   3 Meals and 3 Snacks No  - Not consistent with snacks in between meals    Protein w/ Every Meal and Snack Yes     Eating every 2-3.5hrs while awake  Yes     8-10hrs Fasting (from time of bedtime snack until first meal of the day) Yes          Overall Impression: Pt has a fair compliance and understanding of diet recommendations at this time.    Reinforced Diet Instructions:  Individualized meal plan.   Importance of consistent carbohydrate intake via 3 meals and 3 snacks per day   Importance of protein as it relates to blood glucose control.   Encouraged  patient to eat every 2.0-3.5 hours while awake  Encouraged patient to go no longer than 8-10 hours fasting overnight until first meal of the day.  Provided suggested meal/snack options to increase nutrition and maintain consistent meal and snack intakes.    Physical Activity:  Currently physically active? Yes, Walking daily after dinner for 20-25min     Reviewed w/ Pt:   Benefits of physical activity to optimize blood glucose control, encouraged activity at patient is physically able.   Instructed pt to always consult a physician prior to starting an exercise program.   Recommend 20-30 minutes daily.    Additional Topics Reviewed:    Medications: (reviewed options available with pt)  Discussed if blood sugars are not within normal range with meal planning and exercise  Reviewed medication such as metformin and/or basal/bolus insulin may be needed for better glucose control  Maternal-Fetal Testing:  Ultrasounds: growth scans every 4 weeks.  NST: twice weekly starting at 32nd week GA  BHUPNEDRA:  weekly starting at 32 weeks GA  Sick day Guidelines:  Advised that sickness will raise blood sugar   If blood sugar is > 160 mg/dL twice in one day call doctor  If on diabetes medications, continue as instructed   If unable to consume normal meal plan, instructed to remain well hydrated   Hypoglycemia & Treatment Guidelines:  Reviewed what  "hypoglycemia is, signs and symptoms, and how to treat via the 15:15 rule.  Post-Partum Guidelines:  Completion of 75 gm CHO 2 hr gtt at 6 weeks post-partum to check for Type 2 DM diagnosis  Breastfeeding Guidelines:  Continue GDM meal plan plus additional 350-500 calories daily  Examples of protein and carbohydrate snacks provided.  Stay hydrated by drinking 8-10 (8 oz.) fluids daily.  Dining Out & Travel Guidelines:  Patient advised to be prepared with extra diabetes supplies, medications, and snacks, as well as sticking to the same time schedule and portions eaten at home for meals and snacks.    Patient Stated Goal: \"I will eat 3 meals and 3 snacks each day, including protein at each\"  Goal Assessment: On track    Diabetes Self Management Support Plan outside of ongoing care: Spouse/Family    Barriers to Learning/Change: No Barriers  Expected Compliance: good    Date to report blood sugars: Weekly   Follow up:  Return in 1 week (on 2024) for Insulin Education if BG remain elevated.     Begin Time: 10:00am  End Time: 11:00am    It was a pleasure working with them today. Please feel free to call ([phone]) with any questions or concerns.    Wendy Caceres RD   Diabetes Educator  [facility] Maternal Fetal Medicine  Diabetes and Pregnancy Program  [address]  [place] [address]    Virtual Regular Visit  Name: Brittney Cisse      : 1990      MRN: [id number]  Encounter Provider: Wendy Caceres RD  Encounter Date: 2024   Encounter department: [facility]    Verification of patient location:    Patient is located at Home in the following state in which I hold an active license [place]    Assessment & Plan  Diet controlled gestational diabetes mellitus (GDM) in third trimester    No results found for: \"HGBA1C\"  Orders:    Hemoglobin A1C; Future    Comprehensive metabolic panel; Future    Hemoglobin A1C    Comprehensive metabolic panel    29 weeks " gestation of pregnancy    Orders:    Hemoglobin A1C; Future    Comprehensive metabolic panel; Future    Hemoglobin A1C    Comprehensive metabolic panel    History of insulin controlled gestational diabetes mellitus (GDM)    Orders:    Hemoglobin A1C; Future    Comprehensive metabolic panel; Future    Hemoglobin A1C    Comprehensive metabolic panel    Severe obesity due to excess calories affecting pregnancy, antepartum (HCC)    Orders:    Hemoglobin A1C; Future    Comprehensive metabolic panel; Future    Hemoglobin A1C    Comprehensive metabolic panel        Encounter provider Wendy Caceres RD    The patient was identified by name and date of birth. Brittney Cisse was informed that this is a telemedicine visit and that the visit is being conducted through the [employer] platform. She agrees to proceed..  My office door was closed. No one else was in the room.  She acknowledged consent and understanding of privacy and security of the video platform. The patient has agreed to participate and understands they can discontinue the visit at any time.    Patient is aware this is a billable service.     History of Present Illness     Brittney Cisse is a 33 y.o. female who presents pregnant.      Review of Systems        Objective     LMP 02/25/2024   Physical Exam    Visit Time  Total Visit Duration: 60min

## 2024-09-18 NOTE — ASSESSMENT & PLAN NOTE
"  No results found for: \"HGBA1C\"  Orders:    Hemoglobin A1C; Future    Comprehensive metabolic panel; Future    Hemoglobin A1C    Comprehensive metabolic panel    "

## 2024-09-18 NOTE — ASSESSMENT & PLAN NOTE
Orders:    Hemoglobin A1C; Future    Comprehensive metabolic panel; Future    Hemoglobin A1C    Comprehensive metabolic panel

## 2024-09-25 ENCOUNTER — TELEMEDICINE (OUTPATIENT)
Dept: PERINATAL CARE | Facility: CLINIC | Age: 34
End: 2024-09-25
Payer: COMMERCIAL

## 2024-09-25 DIAGNOSIS — Z86.32 HISTORY OF INSULIN CONTROLLED GESTATIONAL DIABETES MELLITUS (GDM): ICD-10-CM

## 2024-09-25 DIAGNOSIS — O24.414 INSULIN CONTROLLED GESTATIONAL DIABETES MELLITUS (GDM) IN THIRD TRIMESTER: Primary | ICD-10-CM

## 2024-09-25 DIAGNOSIS — Z3A.30 30 WEEKS GESTATION OF PREGNANCY: ICD-10-CM

## 2024-09-25 DIAGNOSIS — E66.01 SEVERE OBESITY DUE TO EXCESS CALORIES AFFECTING PREGNANCY, ANTEPARTUM (HCC): ICD-10-CM

## 2024-09-25 DIAGNOSIS — O99.210 SEVERE OBESITY DUE TO EXCESS CALORIES AFFECTING PREGNANCY, ANTEPARTUM (HCC): ICD-10-CM

## 2024-09-25 DIAGNOSIS — Z71.89 ENCOUNTER FOR INJECTION EDUCATION: ICD-10-CM

## 2024-09-25 LAB
ALBUMIN SERPL-MCNC: 3.9 G/DL (ref 3.9–4.9)
ALP SERPL-CCNC: 63 IU/L (ref 44–121)
ALT SERPL-CCNC: 11 IU/L (ref 0–32)
AST SERPL-CCNC: 10 IU/L (ref 0–40)
BILIRUB SERPL-MCNC: 0.2 MG/DL (ref 0–1.2)
BUN SERPL-MCNC: 9 MG/DL (ref 6–20)
BUN/CREAT SERPL: 18 (ref 9–23)
CALCIUM SERPL-MCNC: 9.3 MG/DL (ref 8.7–10.2)
CHLORIDE SERPL-SCNC: 102 MMOL/L (ref 96–106)
CO2 SERPL-SCNC: 20 MMOL/L (ref 20–29)
CREAT SERPL-MCNC: 0.49 MG/DL (ref 0.57–1)
EGFR: 128 ML/MIN/1.73
EST. AVERAGE GLUCOSE BLD GHB EST-MCNC: 114 MG/DL
GLOBULIN SER-MCNC: 2.8 G/DL (ref 1.5–4.5)
GLUCOSE SERPL-MCNC: 100 MG/DL (ref 70–99)
HBA1C MFR BLD: 5.6 % (ref 4.8–5.6)
POTASSIUM SERPL-SCNC: 4.7 MMOL/L (ref 3.5–5.2)
PROT SERPL-MCNC: 6.7 G/DL (ref 6–8.5)
SODIUM SERPL-SCNC: 138 MMOL/L (ref 134–144)

## 2024-09-25 PROCEDURE — G0108 DIAB MANAGE TRN  PER INDIV: HCPCS

## 2024-09-25 RX ORDER — INSULIN GLARGINE 100 [IU]/ML
INJECTION, SOLUTION SUBCUTANEOUS
Qty: 15 ML | Refills: 0 | Status: SHIPPED | OUTPATIENT
Start: 2024-09-25 | End: 2024-12-01

## 2024-09-25 NOTE — PATIENT INSTRUCTIONS
INSULIN EDUCATION    Thank you for attending our insulin education class.     For a quick recap on what you learned today you can review the [employer] educational video for insulin pens. [email or web address]://[email or web address]/how-to-use-basaglar    Medications are being recommended to decrease the risk of side effects resulting from hyperglycemia in pregnancy including macrosomia,  hypoglycemia, polyhydramnios, pre-term labor and stillbirth.    Insulin Education:  You will receive 5 pens from the pharmacy  Each insulin pen has 300 units in one pen. Can deliver up to 80 units at one time.   When opening a new pen, remove one insulin pen 15 minutes up to 2 hours before administering to bring insulin to room temperature.   Once you open an insulin pen, it is only good for 28 days at room temperature.   Insulin can be titrated every 3-5 days as needed to control blood sugars.  Insulin doses vary as the pregnancy progresses depending on your weekly blood sugars.     Preparing your pen:  Remove pen cap.  Wipe rubber seal with alcohol wipe. Let dry.  Make sure insulin is clear and colorless. Check expiration date. (Do not use if it's cloudy, colored, or had particles or clumps in it.)  Select a new needle each time. Remove tab, connect and twist on top of insulin pen.   Remove outer and inner needle shields that help protect needle. Keep outer shield to recap your used needle once you are done.     Priming, dosing and injecting:  Perform a 2 unit test dose before each injection. Repeat priming steps until you see insulin at tip of needle. (Do not repeat more than 4 times. If after 4 times you do not see insulin at tip of needle, remove and insert a new needle)   Turn your dose knob to the prescribed units. (Do not count clicks, make sure the number and line matches your prescribed dose)  Choose your site. Remember to rotate injection sites and stay away from stretch marks or scars.   Wipe injection site clean with alcohol and  let dry completely.   Insert needle into your skin, press dose knob and inject slowly. Count to 10 before removing needle from skin.     Clean up:  Replace outer needle shield to cover the needle back up. Unscrew capped needle and throw it away in sharps container.  Make sure your sharp container is: heavy duty plastic, puncture-resistant lid, upright and stable, leak resistant, properly labeled.     Hypoglycemia:  Test your blood sugar at 3:00 am for first 3 mornings following insulin start to monitor for hypoglycemia.   Goal range for 2-3 am:  mg/dL.  Treat low blood sugars <60 or <80 using the 15-15 rule  Always have glucose available for hypoglycemia, use 15 by 15 rule. You can find the 15:15 rule in our GDM booklet.     Scheduling:  Non-stress testing two times weekly and BHUPENDRA testing beginning at 32 weeks gestation. Call [phone] to schedule if not already scheduled.   Ultrasounds every 4 weeks at the [facility] Fetal Medicine. Call [phone] to schedule if not already scheduled.     Reporting Blood Sugars:  When adding your blood sugar readings to your glucose flow sheet please ADD UNITS of insulin. This will allow our team see what day you started and when you made any possible adjustments. Important for our team to know in regards to making any changes since it takes 3-5 days to see a difference. We would appreciate this and thank you for all you do to communicate with our team.     Blood Sugar Ranges:  Fastin-90 mg/dL   Before meals:  mg/dL  1 hour after the start of each meal: 140 mg/dL or <   2 hours after start of each meal: 120 mg/dL or <  2-3 am:  mg/dL    Continue Recommendations:  Testing Blood Sugars: 4 times daily (fasting and 1 or 2hrs after the start of each meal) - as instructed    Reporting Blood Sugars: via [employer] Glucose Flowsheet on a weekly basis until you deliver.  Meal Plan: 3 meals and 3 snacks daily.   Eating every 2 to 3.5 hours during the day.    Be sure to have bedtime snack that includes at least 15 grams of carbohydrates and 2 to 3 servings of protein.  Minimum of total carbohydrates of 175 grams daily.   Carbohydrates always need to be paired with protein.   Physical Activity: If ok by your OB try adding a 20-30 minute walk in evening after dinner to help keep fasting glucose within range.  Continue follow up with OB and MFM as recommended.  Stay in close contact with diabetes education team.   While sick or feeling ill, follow our sick day guidelines in our GDM booklet.   For travel and dining out tips refer to our GDM booklet.    If you have any questions or concerns, please do not hesitate to call us at [phone].      Wendy Caceres RD   Diabetes Educator  The Diabetes and Pregnancy Program   at [facility] - Maternal Fetal Medicine

## 2024-09-25 NOTE — PROGRESS NOTES
"Insulin Education - (virtual visit)    Thank you for referring your patient to [facility] Maternal Fetal Medicine Diabetes and Pregnancy Program.     Brittney Cisse is a 33 y.o. female who presents today unaccompanied for Virtual Regular Visit, Patient Education, and Gestational Diabetes.     Patient is at 30w3d gestation, Estimated Date of Delivery: 12/1/24.     Reviewed and updated the following from patients medical record: PMH, Problem List, Allergies, and Current Medications.    Visit Diagnosis:  Encounter Diagnosis     ICD-10-CM    1. Insulin controlled gestational diabetes mellitus (GDM) in third trimester  O24.414       2. 30 weeks gestation of pregnancy  Z3A.30       3. History of insulin controlled gestational diabetes mellitus (GDM)  Z86.32       4. Severe obesity due to excess calories affecting pregnancy, antepartum (HCC)  O99.210     E66.01       5. Encounter for injection education  Z71.89           Labs  GDM LABS: refer to class 1 note    A1C:  Lab Results   Component Value Date/Time    HGBA1C 5.6 09/24/2024 07:27 AM      Labs Ordered This Visit: None    Anthropometrics:  Ht Readings from Last 3 Encounters:   09/12/24 5' 3\" (1.6 m)   08/02/24 5' 3\" (1.6 m)   06/21/24 5' 3\" (1.6 m)     Wt Readings from Last 3 Encounters:   09/12/24 111 kg (243 lb 12.8 oz)   08/02/24 108 kg (237 lb 3.2 oz)   06/21/24 104 kg (228 lb 6.4 oz)     Pre-gravid weight: 102 kg (224 lb)  Pre-gravid BMI: 39.69  Weight Change: 8.981 kg (19 lb 12.8 oz)  Weight gain recommendations: BMI (> 30) 11-20 lbs  Comments: Rate is exceeding recommendations    Recent Ultra Sound Results:  Date: 9/12/24 (28w4d GA)  Fetal Growth:  AC: 96%, EFW: 96%  BHUPENDRA: Normal  Next US date: 10/14/24 at 33w1d    BG Log:        Insulin Education:    Begin 26 units of Lantus once daily at bedtime 9-10pm    The patient was instructed on the following:  Insulin administration times, insulin action.  Hypoglycemia signs, symptoms and treatment. Advised patient " "to test blood sugar at 3:00 am for first 3 mornings following insulin start to monitor for hypoglycemia  Increase in maternal-fetal surveillance with insulin initiation. Plans to complete at [place].  Side effects of hyperglycemia in pregnancy including macrosomia,  hypoglycemia, polyhydramnios, pre-term labor and stillbirth.  Continue to monitor blood glucoses via fingerstick fasting (goal 60 mg/dl to 90 mg/dl) and two hours post prandial (goal less than 120 mg/dl).  Non-stress testing two times weekly and BHUPENDRA testing beginning at 32 weeks gestation.        Ultrasounds every 4 weeks at the [facility].  HbA1c every 6 to 8 weeks    Patient Stated Goal: \"I will eat 3 meals and 3 snacks each day, including protein at each\"  Goal Assessment: On track    Diabetes Self Management Support Plan outside of ongoing care: Spouse/Family    Date to Report Blood Sugars: Weekly till delivery  Follow Up: Return for per review of weekly blood sugar log.     Wendy Caceres RD  Diabetes Educator  [facility] Fetal Medicine  Diabetes and Pregnancy Program  [address] [place] [address], [address]  [place] [address]      Virtual Regular Visit  Name: Brittney Cisse      : 1990      MRN: [id number]  Encounter Provider: Wendy Cacrees RD  Encounter Date: 2024   Encounter department: [facility] [place]    Verification of patient location:    Patient is located at Home in the following state in which I hold an active license [place]    Assessment & Plan  Insulin controlled gestational diabetes mellitus (GDM) in third trimester    Lab Results   Component Value Date    HGBA1C 5.6 2024            30 weeks gestation of pregnancy         History of insulin controlled gestational diabetes mellitus (GDM)         Severe obesity due to excess calories affecting pregnancy, antepartum (HCC)         Encounter for injection education             Encounter provider Wendy Caceres, " DEWEY    The patient was identified by name and date of birth. Brittney Cisse was informed that this is a telemedicine visit and that the visit is being conducted through the Epic Embedded platform. She agrees to proceed..  My office door was closed. No one else was in the room.  She acknowledged consent and understanding of privacy and security of the video platform. The patient has agreed to participate and understands they can discontinue the visit at any time.    Patient is aware this is a billable service.     History of Present Illness     Brittney Cisse is a 33 y.o. female who presents pregnant.      Review of Systems        Objective     LMP 02/25/2024   Physical Exam    Visit Time  Total Visit Duration: 30min

## 2024-10-09 ENCOUNTER — TREATMENT (OUTPATIENT)
Dept: PERINATAL CARE | Facility: CLINIC | Age: 34
End: 2024-10-09

## 2024-10-09 DIAGNOSIS — Z79.4 INSULIN DOSE CHANGED (HCC): ICD-10-CM

## 2024-10-09 DIAGNOSIS — O24.414 INSULIN CONTROLLED GESTATIONAL DIABETES MELLITUS (GDM) IN THIRD TRIMESTER: Primary | ICD-10-CM

## 2024-10-09 DIAGNOSIS — Z3A.32 32 WEEKS GESTATION OF PREGNANCY: ICD-10-CM

## 2024-10-09 NOTE — PROGRESS NOTES
"Blood Sugar Log  Method of Reporting: [employer] Glucose Flowsheet   Date: 10/09/24    Patient: Brittney Cisse  : 1990    Estimated Date of Delivery: 24  GA: 32w3d     Reason for Documentation: Blood Sugar Log (10/3/24 - 10/9/24), Gestational Diabetes (32w3d  Insulin-controlled), and Medication Dose Change (Lantus)     ASSESSMENT - REVIEW OF BG LOG     BG Log:        Assessment:  FBG: Not Well Controlled; Consistently Elevated (>90mg/dl)   PPBG: Well Controlled (<120mg/dl); Few elevated after lunch earlier in the week    PLAN     MEDICATIONS:     Due to fasting blood sugars >90,   Increase Lantus from 32 to 38 units once daily at bedtime (9-10pm)    DIET:   Continue Assigned Meal Plan (including 3 meals and 3 snacks): 2000 calorie (CHO: 45-15-60-15-60-30) (PRO: 2/3-1-3/4-1-3/4-2)    BLOOD SUGAR MONITORING: (Glucometer: Contour Next EZ)  Continue Testing B x per day (Fasting, 2 hour after start of each meal)    PHYSICAL ACTIVITY:  Continue walking (or other preferred physical activity) daily - recommend at least 20-30 minutes daily, preferably after dinner if able (unless otherwise instructed per OB)     MONITORING     EDUCATION: (Diabetes and Pregnancy Program)    Completed: Class 1 (Pt Goal: \"I will eat 3 meals and 3 snacks each day, including protein at each\"), Class 2, and Insulin Education    Needs Scheduled: None at this time, Follow-ups to be scheduled as indicated per weekly review of blood sugar logs    WEIGHT:     Pre-Gravid Wt Pre-Gravid BMI TWG   102 kg (224 lb) 39.69 8.981 kg (19 lb 12.8 oz)     FETAL MONITORITNG - ULTRASOUNDS  Recent Ultrasounds Findings:  (24; 28w4d) AC: 96%, EFW: 96%; NML BHUPENDRA;  Growth assessment indicated possible macrosomia   US Follow-Ups: Scheduled Appropriately  Further Fetal Surveillance: Beginning at 32 weeks (NST twice weekly + BHUPENDRA once a week)    LABS  Lab Results   Component Value Date/Time    HGBA1C 5.6 2024 07:27 AM     Active Orders (needing " to be collected):  None    Wendy Caceres RD   Diabetes and Pregnancy Program   Maternal Fetal Medicine  [facility]

## 2024-10-09 NOTE — PATIENT INSTRUCTIONS
MEDICATIONS:     Due to fasting blood sugars >90,   Increase Lantus from 32 to 38 units once daily at bedtime (9-10pm)    DIET:   Continue Assigned Meal Plan (including 3 meals and 3 snacks): 2000 calorie (CHO: 45-15-60-15-60-30) (PRO: 2/3-1-3/4-1-3/4-2)    BLOOD SUGAR MONITORING: (Glucometer: Contour Next EZ)  Continue Testing B x per day (Fasting, 2 hour after start of each meal)    PHYSICAL ACTIVITY:  Continue walking (or other preferred physical activity) daily - recommend at least 20-30 minutes daily, preferably after dinner if able (unless otherwise instructed per OB)

## 2024-10-09 NOTE — PATIENT INSTRUCTIONS
"Patient Education     Your baby's movement before birth   The Basics   Written by the doctors and editors at [facility]   When should I start feeling my baby move? -- It depends. Most people first feel their baby moving in the uterus between about 16 and 20 weeks of pregnancy. It might take longer to feel movement if this is your first pregnancy or if the placenta is in the front of your uterus.  What kinds of movements should I feel? -- When you first feel your baby move, it might feel like a gentle flutter in your belly. This is sometimes called \"quickening.\" As the baby grows, their movements will get stronger. You will probably feel them kicking, rolling, and stretching. Later in pregnancy, you might be able to see and feel the baby moving from the outside.  You might notice that your baby is more active at certain times of the day or night. Even before birth, babies have periods of being asleep and awake. When your baby is sleeping, you might notice that they do not move as much.  Should I keep track of my baby's movements? -- If your pregnancy is healthy, you probably do not need to count or record your baby's movements. Feeling regular movement is a good sign that the baby is doing well.  In some cases, your doctor or midwife might ask you to keep track of your baby's movements. If so, they will tell you how to do this and when to call them.  A change in your baby's movements does not always mean that there is a problem. But in some cases, it can be a sign that the baby is having trouble. If your doctor or midwife is concerned, they can do tests to check on the baby.  If I am asked to track movement, how should I do it? -- There are different ways of tracking your baby's movement. This is sometimes called \"kick counting.\"  Your doctor or midwife will tell you exactly what to track. For example, they might ask you to write down:   How long it takes to feel 10 kicks or movements   How many times your baby moves " in 1 hour  Many experts consider at least 10 movements in 2 hours to be a sign that the baby is doing well. But there is no specific cutoff for exactly how much movement is healthy or unhealthy. Some babies are more active than others, and some pregnant people feel movement more easily than others. The main goal of kick counting is to get to know your baby's normal patterns so you can tell if anything changes.  If you are doing kick counting:   Choose a time of day when your baby is usually active.   Find a quiet place where you will not be distracted.   Lie down on your side in a comfortable position.   Check the clock, or set a timer.   Each time you feel your baby move or kick, write down the time. Some people use a smartphone shu to keep track.   If your baby seems less active than usual, try moving around, eating a snack, and emptying your bladder. This can help wake the baby up if they are asleep.   Stop counting after you have felt 10 kicks, or after the length of time your doctor or midwife told you.  When should I call the doctor? -- Call your doctor or midwife for advice if:   You have concerns about your baby's movement.   Your baby is moving less than they normally do.   You notice a sudden change in the pattern of your baby's movements.   You have any other symptoms that worry you.  All topics are updated as new evidence becomes available and our peer review process is complete.  This topic retrieved from [employer] on: Feb 26, 2024.  Topic 834701 Version 1.0  Release: 32.2.4 - C32.56  © 2024 UpToDate, Inc. and/or its affiliates. All rights reserved.  Consumer Information Use and Disclaimer   Disclaimer: This generalized information is a limited summary of diagnosis, treatment, and/or medication information. It is not meant to be comprehensive and should be used as a tool to help the user understand and/or assess potential diagnostic and treatment options. It does NOT include all information about  conditions, treatments, medications, side effects, or risks that may apply to a specific patient. It is not intended to be medical advice or a substitute for the medical advice, diagnosis, or treatment of a health care provider based on the health care provider's examination and assessment of a patient's specific and unique circumstances. Patients must speak with a health care provider for complete information about their health, medical questions, and treatment options, including any risks or benefits regarding use of medications. This information does not endorse any treatments or medications as safe, effective, or approved for treating a specific patient. UpToDate, Inc. and its affiliates disclaim any warranty or liability relating to this information or the use thereof.The use of this information is governed by the Terms of Use, available at https://www.WeGoOut.com/en/know/clinical-effectiveness-terms. © UpToDate, Inc. and its affiliates and/or licensors. All rights reserved.  Copyright   ©  UpToDate, Inc. and/or its affiliates. All rights reserved.  Thank you for choosing us for your  care today.  If you have any questions about your ultrasound or care, please do not hesitate to contact us or your primary obstetrician.        Some general instructions for your pregnancy are:    Exercise: Aim for 150 minutes per week of regular exercise.  Walking is great!  Nutrition: Choose healthy sources of calcium, iron, and protein.  Avoid ultraprocessed foods and added sugar.  Learn about Preeclampsia: preeclampsia is a common, potentially serious high blood pressure complication in pregnancy.  A blood pressure of 140mmHg (systolic or top number) or 90mmHg (diastolic or bottom number) should be evaluated by your doctor.  Aspirin is sometimes prescribed in early pregnancy to prevent preeclampsia in women with risk factors - ask your obstetrician if you should be on this medication.  For more  resources, visit:  https://www.highriskpregnancyinfo.org/preeclampsia  If you smoke, please try to quit completely but also try to reduce your smoking by as much as possible (as soon as possible).  Do not vape.  Please also avoid cannabis products.  Other warning signs to watch out for in pregnancy or postpartum: chest pain, obstructed breathing or shortness of breath, seizures, thoughts of hurting yourself or your baby, bleeding, a painful or swollen leg, fever, or headache (see [employer] POST-BIRTH Warning Signs campaign).  If these happen call 911.  Itching is also not normal in pregnancy and if you experience this, especially over your hands and feet, potentially worse at night, notify your doctors.

## 2024-10-12 NOTE — PROGRESS NOTES
Please refer to the [facility] ultrasound report in Ob Procedures for additional information regarding today's visit

## 2024-10-14 ENCOUNTER — ULTRASOUND (OUTPATIENT)
Dept: PERINATAL CARE | Facility: OTHER | Age: 34
End: 2024-10-14
Payer: COMMERCIAL

## 2024-10-14 VITALS
HEART RATE: 114 BPM | HEIGHT: 63 IN | DIASTOLIC BLOOD PRESSURE: 80 MMHG | SYSTOLIC BLOOD PRESSURE: 124 MMHG | BODY MASS INDEX: 43.41 KG/M2 | WEIGHT: 245 LBS

## 2024-10-14 DIAGNOSIS — O36.63X0 LARGE FOR GESTATIONAL AGE FETUS AFFECTING MOTHER, ANTEPARTUM, THIRD TRIMESTER, SINGLE GESTATION: ICD-10-CM

## 2024-10-14 DIAGNOSIS — O99.213 MATERNAL OBESITY, ANTEPARTUM, THIRD TRIMESTER: ICD-10-CM

## 2024-10-14 DIAGNOSIS — O24.414 INSULIN CONTROLLED GESTATIONAL DIABETES MELLITUS (GDM) IN THIRD TRIMESTER: Primary | ICD-10-CM

## 2024-10-14 DIAGNOSIS — E66.812 CLASS 2 OBESITY: ICD-10-CM

## 2024-10-14 DIAGNOSIS — Z3A.33 33 WEEKS GESTATION OF PREGNANCY: ICD-10-CM

## 2024-10-14 PROCEDURE — 76816 OB US FOLLOW-UP PER FETUS: CPT | Performed by: OBSTETRICS & GYNECOLOGY

## 2024-10-14 PROCEDURE — 99214 OFFICE O/P EST MOD 30 MIN: CPT | Performed by: OBSTETRICS & GYNECOLOGY

## 2024-10-14 NOTE — LETTER
October 14, 2024     Cheri Gamez MD  [address]  [place] [address]    Patient: Brittney Cisse   YOB: 1990   Date of Visit: 10/14/2024       Dear Dr. Gamez:    Thank you for referring Brittney Cisse to me for evaluation. Below are my notes for this consultation.    If you have questions, please do not hesitate to call me. I look forward to following your patient along with you.         Sincerely,        Alexi Levine MD        CC: No Recipients    Alexi Levine MD  10/14/2024 11:31 AM  Sign when Signing Visit  Please refer to the [facility] ultrasound report in Ob Procedures for additional information regarding today's visit

## 2024-10-18 ENCOUNTER — TREATMENT (OUTPATIENT)
Dept: PERINATAL CARE | Facility: CLINIC | Age: 34
End: 2024-10-18

## 2024-10-18 DIAGNOSIS — O24.414 INSULIN CONTROLLED GESTATIONAL DIABETES MELLITUS (GDM) IN THIRD TRIMESTER: Primary | ICD-10-CM

## 2024-10-18 DIAGNOSIS — Z3A.33 33 WEEKS GESTATION OF PREGNANCY: ICD-10-CM

## 2024-10-18 NOTE — PATIENT INSTRUCTIONS
MEDICATIONS:     Due to fasting blood sugars >90,   Increase Lantus from 44 to 48 units once daily at bedtime (9-10pm); split into two separate equal injections of 24 units each    Begin Split Dose of Insulin: Large volumes of insulin are split and injected in separate areas to improve absorption.    Instructions for Split Dose Insulin: (Split Dose into 2 equal injections of 24 units each)  - Inject 24 units twice, one injection right after the other, spaced 2-3 inches apart in the same body part.   - Be sure to continue the same practice of sterilizing your insulin pen and priming insulin pen with 2 units each injection.  - Write down time and units of insulin given for each dose for a visual prior to injecting yourself. This is a safety precaution to prevent you from accidentally forgetting your first injection.    DIET:   Continue Assigned Meal Plan (including 3 meals and 3 snacks): 2000 calorie (CHO: 45-15-60-15-60-30) (PRO: 2/3-1-3/4-1-3/4-2)    BLOOD SUGAR MONITORING: (Glucometer: Contour Next EZ)  Continue Testing B x per day (Fasting, 2 hour after start of each meal)    PHYSICAL ACTIVITY:  Continue walking (or other preferred physical activity) daily - recommend at least 20-30 minutes daily, preferably after dinner if able (unless otherwise instructed per OB)

## 2024-10-18 NOTE — PROGRESS NOTES
"Blood Sugar Log  Method of Reporting: [employer] Glucose Flowsheet   Date: 10/18/24    Patient: Brittney Cisse  : 1990    Estimated Date of Delivery: 24  GA: 33w5d    Reason for Documentation: Blood Sugar Log (10/14/24 - 10/17/24), Gestational Diabetes (33w5d  Insulin-controlled), and Medication Dose Change (Increase Lantus)     ASSESSMENT - REVIEW OF BG LOG     BG Log:        Assessment:  FBG: Not Well Controlled; Consistently Elevated (>90mg/dl)   PPBG: Well Controlled (<120mg/dl)    PLAN     MEDICATIONS:     Due to fasting blood sugars >90,   Increase Lantus from 44 to 48 units once daily at bedtime (9-10pm); split into two separate equal injections of 24 units each    DIET:   Continue Assigned Meal Plan (including 3 meals and 3 snacks): 2000 calorie (CHO: 45-15-60-15-60-30) (PRO: 2/3-1-3/4-1-3/4-2)    BLOOD SUGAR MONITORING: (Glucometer: Contour Next EZ)  Continue Testing B x per day (Fasting, 2 hour after start of each meal)    PHYSICAL ACTIVITY:  Continue walking (or other preferred physical activity) daily - recommend at least 20-30 minutes daily, preferably after dinner if able (unless otherwise instructed per OB)     MONITORING     EDUCATION: (Diabetes and Pregnancy Program)    Completed: Class 1 (Pt Goal: \"I will eat 3 meals and 3 snacks each day, including protein at each\"), Class 2, and Insulin Education    Needs Scheduled: None at this time, Follow-ups to be scheduled as indicated per weekly review of blood sugar logs    WEIGHT:     Pre-Gravid Wt Pre-Gravid BMI TWG   102 kg (224 lb) 39.69 9.526 kg (21 lb)     FETAL MONITORITNG - ULTRASOUNDS  Recent Ultrasounds Findings: US (10/14/24; 33w1d) AC: 98%, EFW: 95%, NML BHUPENDRA; Growth assessment indicated possible macrosomia   US Follow-Ups: Scheduled Appropriately  Further Fetal Surveillance: Beginning at 32 weeks (NST twice weekly + BHUPENDRA once a week)    LABS  Lab Results   Component Value Date/Time    HGBA1C 5.6 2024 07:27 AM     Active " Orders (needing to be collected):  None    Wendy Caceres RD   Diabetes and Pregnancy Program   Maternal Fetal Medicine  [facility]

## 2024-10-21 DIAGNOSIS — Z3A.34 34 WEEKS GESTATION OF PREGNANCY: ICD-10-CM

## 2024-10-21 DIAGNOSIS — O24.414 INSULIN CONTROLLED GESTATIONAL DIABETES MELLITUS (GDM) IN THIRD TRIMESTER: Primary | ICD-10-CM

## 2024-10-21 DIAGNOSIS — Z3A.30 30 WEEKS GESTATION OF PREGNANCY: ICD-10-CM

## 2024-10-21 RX ORDER — INSULIN GLARGINE 100 [IU]/ML
48 INJECTION, SOLUTION SUBCUTANEOUS
Qty: 15 ML | Refills: 0 | Status: SHIPPED | OUTPATIENT
Start: 2024-10-21

## 2024-10-21 NOTE — TELEPHONE ENCOUNTER
[facility] pharmacy states that the Lantus SoloStar 100 units/mL SOPN needs a max daily dose on the script.   Please review and send new script [id number] - [place] - [address] [facility]

## 2024-10-22 ENCOUNTER — TELEPHONE (OUTPATIENT)
Facility: HOSPITAL | Age: 34
End: 2024-10-22

## 2024-10-22 DIAGNOSIS — Z3A.34 34 WEEKS GESTATION OF PREGNANCY: ICD-10-CM

## 2024-10-22 DIAGNOSIS — O24.414 INSULIN CONTROLLED GESTATIONAL DIABETES MELLITUS (GDM) IN THIRD TRIMESTER: Primary | ICD-10-CM

## 2024-10-22 RX ORDER — INSULIN GLARGINE 100 [IU]/ML
48 INJECTION, SOLUTION SUBCUTANEOUS
Qty: 15 ML | Refills: 0 | Status: CANCELLED | OUTPATIENT
Start: 2024-10-22

## 2024-10-25 ENCOUNTER — TREATMENT (OUTPATIENT)
Dept: PERINATAL CARE | Facility: CLINIC | Age: 34
End: 2024-10-25

## 2024-10-25 DIAGNOSIS — O24.414 INSULIN CONTROLLED GESTATIONAL DIABETES MELLITUS (GDM) IN THIRD TRIMESTER: Primary | ICD-10-CM

## 2024-10-25 DIAGNOSIS — Z79.4 INSULIN DOSE CHANGED (HCC): ICD-10-CM

## 2024-10-25 DIAGNOSIS — Z3A.34 34 WEEKS GESTATION OF PREGNANCY: ICD-10-CM

## 2024-10-25 NOTE — PROGRESS NOTES
"Blood Sugar Log  Method of Reporting: [employer] Glucose Flowsheet   Date: 10/25/24    Patient: Brittney Cisse  : 1990    Estimated Date of Delivery: 24  GA: 34w5d    Reason for Documentation: Blood Sugar Log (10/20/24 - 10/25/24), Gestational Diabetes (34w5d  Insulin-controlled), and Medication Dose Change (Lantus)     ASSESSMENT - REVIEW OF BG LOG     BG Log:        Assessment:  FBG: Not Well Controlled; Consistently Elevated (>90mg/dl)   PPBG: Well Controlled (<120mg/dl)    PLAN     MEDICATIONS:     Due to fasting blood sugars >90,   Increase Lantus from 48 to 56 units once daily at bedtime (9-10pm); split into two separate equal injections of 28 units each    DIET:   Continue Assigned Meal Plan (including 3 meals and 3 snacks): 2000 calorie (CHO: 45-15-60-15-60-30) (PRO: 2/3-1-3/4-1-3/4-2)    BLOOD SUGAR MONITORING: (Glucometer: Contour Next EZ)  Continue Testing B x per day (Fasting, 2 hour after start of each meal)    PHYSICAL ACTIVITY:  Continue walking (or other preferred physical activity) daily - recommend at least 20-30 minutes daily, preferably after dinner if able (unless otherwise instructed per OB)     MONITORING     EDUCATION: (Diabetes and Pregnancy Program)    Completed: Class 1 (Pt Goal: \"I will eat 3 meals and 3 snacks each day, including protein at each\"), Class 2, and Insulin Education    Needs Scheduled: None at this time, Follow-ups to be scheduled as indicated per weekly review of blood sugar logs    WEIGHT:     Pre-Gravid Wt Pre-Gravid BMI TWG   102 kg (224 lb) 39.69 9.526 kg (21 lb)     FETAL MONITORITNG - ULTRASOUNDS  Recent Ultrasounds Findings: US (10/14/24; 33w1d) AC: 98%, EFW: 95%, NML BHUPENDRA; Growth assessment indicated possible macrosomia   US Follow-Ups: Scheduled Appropriately - 24  Further Fetal Surveillance: Beginning at 32 weeks (NST twice weekly + BHUPENDRA once a week)    LABS  Lab Results   Component Value Date/Time    HGBA1C 5.6 2024 07:27 AM "     Active Orders (needing to be collected):  None    Wendy Caceres RD   Diabetes and Pregnancy Program   Maternal Fetal Medicine  [facility]

## 2024-10-25 NOTE — PATIENT INSTRUCTIONS
MEDICATIONS:     Due to fasting blood sugars >90,   Increase Lantus from 48 to 56 units once daily at bedtime (9-10pm); split into two separate equal injections of 28 units each    DIET:   Continue Assigned Meal Plan (including 3 meals and 3 snacks): 2000 calorie (CHO: 45-15-60-15-60-30) (PRO: 2/3-1-3/4-1-3/4-2)    BLOOD SUGAR MONITORING: (Glucometer: Contour Next EZ)  Continue Testing B x per day (Fasting, 2 hour after start of each meal)    PHYSICAL ACTIVITY:  Continue walking (or other preferred physical activity) daily - recommend at least 20-30 minutes daily, preferably after dinner if able (unless otherwise instructed per OB)

## 2024-11-11 ENCOUNTER — ULTRASOUND (OUTPATIENT)
Dept: PERINATAL CARE | Facility: OTHER | Age: 34
End: 2024-11-11
Payer: COMMERCIAL

## 2024-11-11 VITALS
DIASTOLIC BLOOD PRESSURE: 80 MMHG | WEIGHT: 244.8 LBS | BODY MASS INDEX: 43.38 KG/M2 | SYSTOLIC BLOOD PRESSURE: 124 MMHG | HEIGHT: 63 IN | HEART RATE: 107 BPM

## 2024-11-11 DIAGNOSIS — Z3A.37 37 WEEKS GESTATION OF PREGNANCY: ICD-10-CM

## 2024-11-11 DIAGNOSIS — O24.414 INSULIN CONTROLLED GESTATIONAL DIABETES MELLITUS (GDM) IN THIRD TRIMESTER: Primary | ICD-10-CM

## 2024-11-11 DIAGNOSIS — O36.63X0 LARGE FOR GESTATIONAL AGE FETUS AFFECTING MOTHER, ANTEPARTUM, THIRD TRIMESTER, SINGLE GESTATION: ICD-10-CM

## 2024-11-11 PROCEDURE — 76816 OB US FOLLOW-UP PER FETUS: CPT | Performed by: OBSTETRICS & GYNECOLOGY

## 2024-11-11 NOTE — PROGRESS NOTES
Please refer to the [facility] ultrasound report in Ob Procedures for additional information regarding today's visit

## 2024-11-11 NOTE — LETTER
November 11, 2024     Cheri Gamez MD  [address]  [place] [address]    Patient: Brittney Cisse   YOB: 1990   Date of Visit: 11/11/2024       Dear Dr. Gamez:    Thank you for referring Brittney Cisse to me for evaluation. Below are my notes for this consultation.    If you have questions, please do not hesitate to call me. I look forward to following your patient along with you.         Sincerely,        Alexi Levine MD        CC: No Recipients    Alexi Levine MD  11/11/2024 11:44 AM  Sign when Signing Visit  Please refer to the [facility] ultrasound report in Ob Procedures for additional information regarding today's visit

## 2024-11-11 NOTE — PATIENT INSTRUCTIONS
"Patient Education     Your baby's movement before birth   The Basics   Written by the doctors and editors at [facility]   When should I start feeling my baby move? -- It depends. Most people first feel their baby moving in the uterus between about 16 and 20 weeks of pregnancy. It might take longer to feel movement if this is your first pregnancy or if the placenta is in the front of your uterus.  What kinds of movements should I feel? -- When you first feel your baby move, it might feel like a gentle flutter in your belly. This is sometimes called \"quickening.\" As the baby grows, their movements will get stronger. You will probably feel them kicking, rolling, and stretching. Later in pregnancy, you might be able to see and feel the baby moving from the outside.  You might notice that your baby is more active at certain times of the day or night. Even before birth, babies have periods of being asleep and awake. When your baby is sleeping, you might notice that they do not move as much.  Should I keep track of my baby's movements? -- If your pregnancy is healthy, you probably do not need to count or record your baby's movements. Feeling regular movement is a good sign that the baby is doing well.  In some cases, your doctor or midwife might ask you to keep track of your baby's movements. If so, they will tell you how to do this and when to call them.  A change in your baby's movements does not always mean that there is a problem. But in some cases, it can be a sign that the baby is having trouble. If your doctor or midwife is concerned, they can do tests to check on the baby.  If I am asked to track movement, how should I do it? -- There are different ways of tracking your baby's movement. This is sometimes called \"kick counting.\"  Your doctor or midwife will tell you exactly what to track. For example, they might ask you to write down:   How long it takes to feel 10 kicks or movements   How many times your baby moves " in 1 hour  Many experts consider at least 10 movements in 2 hours to be a sign that the baby is doing well. But there is no specific cutoff for exactly how much movement is healthy or unhealthy. Some babies are more active than others, and some pregnant people feel movement more easily than others. The main goal of kick counting is to get to know your baby's normal patterns so you can tell if anything changes.  If you are doing kick counting:   Choose a time of day when your baby is usually active.   Find a quiet place where you will not be distracted.   Lie down on your side in a comfortable position.   Check the clock, or set a timer.   Each time you feel your baby move or kick, write down the time. Some people use a smartphone shu to keep track.   If your baby seems less active than usual, try moving around, eating a snack, and emptying your bladder. This can help wake the baby up if they are asleep.   Stop counting after you have felt 10 kicks, or after the length of time your doctor or midwife told you.  When should I call the doctor? -- Call your doctor or midwife for advice if:   You have concerns about your baby's movement.   Your baby is moving less than they normally do.   You notice a sudden change in the pattern of your baby's movements.   You have any other symptoms that worry you.  All topics are updated as new evidence becomes available and our peer review process is complete.  This topic retrieved from [employer] on: Feb 26, 2024.  Topic 639513 Version 1.0  Release: 32.2.4 - C32.56  © 2024 UpToDate, Inc. and/or its affiliates. All rights reserved.  Consumer Information Use and Disclaimer   Disclaimer: This generalized information is a limited summary of diagnosis, treatment, and/or medication information. It is not meant to be comprehensive and should be used as a tool to help the user understand and/or assess potential diagnostic and treatment options. It does NOT include all information about  conditions, treatments, medications, side effects, or risks that may apply to a specific patient. It is not intended to be medical advice or a substitute for the medical advice, diagnosis, or treatment of a health care provider based on the health care provider's examination and assessment of a patient's specific and unique circumstances. Patients must speak with a health care provider for complete information about their health, medical questions, and treatment options, including any risks or benefits regarding use of medications. This information does not endorse any treatments or medications as safe, effective, or approved for treating a specific patient. UpToDate, Inc. and its affiliates disclaim any warranty or liability relating to this information or the use thereof.The use of this information is governed by the Terms of Use, available at https://www.woltersScubaTribeuwer.com/en/know/clinical-effectiveness-terms. 2024© UpToDate, Inc. and its affiliates and/or licensors. All rights reserved.  Copyright   © 2024 UpToDate, Inc. and/or its affiliates. All rights reserved.

## 2024-11-15 ENCOUNTER — TREATMENT (OUTPATIENT)
Dept: PERINATAL CARE | Facility: CLINIC | Age: 34
End: 2024-11-15

## 2024-11-15 DIAGNOSIS — Z3A.37 37 WEEKS GESTATION OF PREGNANCY: ICD-10-CM

## 2024-11-15 DIAGNOSIS — Z79.4 INSULIN DOSE CHANGED (HCC): ICD-10-CM

## 2024-11-15 DIAGNOSIS — O24.414 INSULIN CONTROLLED GESTATIONAL DIABETES MELLITUS (GDM) IN THIRD TRIMESTER: Primary | ICD-10-CM

## 2024-11-15 NOTE — PROGRESS NOTES
"Blood Sugar Log  Date: 11/15/24    Patient: Brittney Cisse  : 1990    Estimated Date of Delivery: 24  GA: 37w5d    Reason for Documentation: Blood Sugar Log (24 - 24), Gestational Diabetes (37w5d  Insulin-controlled), and Medication Dose Change (Lantus Increase)     ASSESSMENT - REVIEW OF BG LOG     BG Log:    Pt states via [email or web address] Message fasting blood sugars for  and  were 94 and 98.         Assessment:  FBG: Not Well Controlled; Consistently Elevated (>90mg/dl)   PPBG: Well Controlled (<120mg/dl)    PLAN     MEDICATIONS:     Due to fasting blood sugars >90,   Increase Lantus from 56 to 60 units once daily at bedtime (9-10pm); split into two separate equal injections of 30 units each  Send a message to Rhianna Feliz on Monday () if fasting blood sugars remain >90 to notify the diabetes team to review to determine if an additional insulin adjustment is needed.    DIET: Continue Assigned Meal Plan (including 3 meals and 3 snacks): 2000 calorie (CHO: 45-15-60-15-60-30) (PRO: 2/3-1-3/4-1-3/4-2)    Due to fasting blood sugar in the 70s earlier in the week,   Use the 15-15 rule to treat a low blood sugar <60 or <80 with symptoms. Call [phone] to notify the diabetes team if you experience a blood sugar <60.  Avoid fasting greater than 10 hours overnight  Have a bedtime snack containing 15g of carbohydrate and 2-3oz of protein    BLOOD SUGAR MONITORING: Continue Testing B x per day (Fasting, 2 hour after start of each meal) - (Glucometer: Contour Next EZ)     PHYSICAL ACTIVITY: Continue walking (or other preferred physical activity) daily - recommend at least 20-30 minutes daily, preferably after dinner if able (unless otherwise instructed per OB)     MONITORING     EDUCATION: (Diabetes and Pregnancy Program)    Completed: Class 1 (Pt Goal: \"I will eat 3 meals and 3 snacks each day, including protein at each\"), Class 2, and Insulin Education    Needs Scheduled: None at " this time, Follow-ups to be scheduled as indicated per weekly review of blood sugar logs    WEIGHT:     Pre-Gravid Wt Pre-Gravid BMI TWG   102 kg (224 lb) 39.69 9.435 kg (20 lb 12.8 oz)     FETAL MONITORITNG - ULTRASOUNDS  Recent Ultrasounds Findings: (11/11/24; 37w1d) AC: >99%, EFW: 97%; NML BHUPENDRA; Growth assessment indicated possible macrosomia   US Follow-Ups: None  Further Fetal Surveillance: Beginning at 32 weeks (NST twice weekly + BHUPENDRA once a week)    LABS  Lab Results   Component Value Date/Time    HGBA1C 5.6 09/24/2024 07:27 AM     Active Orders (needing to be collected):  None    Wendy Caceres RD   Diabetes and Pregnancy Program   Maternal Fetal Medicine  [facility]

## 2024-11-15 NOTE — PATIENT INSTRUCTIONS
MEDICATIONS:     Due to fasting blood sugars >90,   Increase Lantus from 56 to 60 units once daily at bedtime (9-10pm); split into two separate equal injections of 30 units each  Send a message to Rhianna Feliz on Monday () if fasting blood sugars remain >90 to notify the diabetes team to review to determine if an additional insulin adjustment is needed.    DIET: Continue Assigned Meal Plan (including 3 meals and 3 snacks): 2000 calorie (CHO: 45-15-60-15-60-30) (PRO: 2/3-1-3/4-1-3/4-2)    Due to fasting blood sugar in the 70s earlier in the week,   Use the 15-15 rule to treat a low blood sugar <60 or <80 with symptoms. Call [phone] to notify the diabetes team if you experience a blood sugar <60.  Avoid fasting greater than 10 hours overnight  Have a bedtime snack containing 15g of carbohydrate and 2-3oz of protein    BLOOD SUGAR MONITORING: Continue Testing B x per day (Fasting, 2 hour after start of each meal) - (Glucometer: Contour Next EZ)     PHYSICAL ACTIVITY: Continue walking (or other preferred physical activity) daily - recommend at least 20-30 minutes daily, preferably after dinner if able (unless otherwise instructed per OB)

## 2024-11-18 DIAGNOSIS — O24.414 INSULIN CONTROLLED GESTATIONAL DIABETES MELLITUS (GDM) IN THIRD TRIMESTER: ICD-10-CM

## 2024-11-18 DIAGNOSIS — Z3A.34 34 WEEKS GESTATION OF PREGNANCY: ICD-10-CM

## 2024-11-18 RX ORDER — INSULIN GLARGINE 100 [IU]/ML
60 INJECTION, SOLUTION SUBCUTANEOUS
Qty: 15 ML | Refills: 0 | Status: SHIPPED | OUTPATIENT
Start: 2024-11-18

## 2024-11-21 ENCOUNTER — TREATMENT (OUTPATIENT)
Dept: PERINATAL CARE | Facility: CLINIC | Age: 34
End: 2024-11-21

## 2024-11-21 DIAGNOSIS — Z79.4 INSULIN DOSE CHANGED (HCC): ICD-10-CM

## 2024-11-21 DIAGNOSIS — O24.414 INSULIN CONTROLLED GESTATIONAL DIABETES MELLITUS (GDM) IN THIRD TRIMESTER: Primary | ICD-10-CM

## 2024-11-21 DIAGNOSIS — Z3A.38 38 WEEKS GESTATION OF PREGNANCY: ICD-10-CM

## 2024-11-21 NOTE — PATIENT INSTRUCTIONS
MEDICATIONS:     Due to fasting blood sugars >90,   Increase Lantus from 60 to 64 units once daily at bedtime (9-10pm); split into two separate equal injections of 32 units each    DIET: Continue Assigned Meal Plan (including 3 meals and 3 snacks): 2000 calorie (CHO: 45-15-60-15-60-30) (PRO: 2/3-1-3/4-1-3/4-2)    BLOOD SUGAR MONITORING: Continue Testing B x per day (Fasting, 2 hour after start of each meal) - (Glucometer: Contour Next EZ)     PHYSICAL ACTIVITY: Continue walking (or other preferred physical activity) daily - recommend at least 20-30 minutes daily, preferably after dinner if able (unless otherwise instructed per OB)

## 2024-11-21 NOTE — PROGRESS NOTES
"Blood Sugar Log  Date: 24    Patient: Brittney Cisse  : 1990    Estimated Date of Delivery: 24  GA: 38w4d    Reason for Documentation: Blood Sugar Log (24 - 24), Gestational Diabetes (38w4d  Insulin-controlled), and Medication Dose Change (Increase Lantus)     ASSESSMENT - REVIEW OF BG LOG     BG Log:      *Per pt increased to 60 units on 11/15/24    Assessment:  FBG: Not Well Controlled; Consistently Elevated (>90mg/dl)   PPBG: Few Elevated (>120mg/dl) - Not Consistently Elevated    PLAN     MEDICATIONS:     Due to fasting blood sugars >90,   Increase Lantus from 60 to 64 units once daily at bedtime (9-10pm); split into two separate equal injections of 32 units each    DIET: Continue Assigned Meal Plan (including 3 meals and 3 snacks): 2000 calorie (CHO: 45-15-60-15-60-30) (PRO: 2/3-1-3/4-1-3/4-2)    BLOOD SUGAR MONITORING: Continue Testing B x per day (Fasting, 2 hour after start of each meal) - (Glucometer: Contour Next EZ)     PHYSICAL ACTIVITY: Continue walking (or other preferred physical activity) daily - recommend at least 20-30 minutes daily, preferably after dinner if able (unless otherwise instructed per OB)     MONITORING     EDUCATION: (Diabetes and Pregnancy Program)    Completed: Class 1 (Pt Goal: \"I will eat 3 meals and 3 snacks each day, including protein at each\"), Class 2, and Insulin Education    Needs Scheduled: None at this time, Follow-ups to be scheduled as indicated per weekly review of blood sugar logs    WEIGHT:     Pre-Gravid Wt Pre-Gravid BMI TWG   102 kg (224 lb) 39.69 9.435 kg (20 lb 12.8 oz)     FETAL MONITORITNG - ULTRASOUNDS  Recent Ultrasounds Findings: (24; 37w1d) AC: >99%, EFW: 97%; NML BHUPENDRA; Growth assessment indicated possible macrosomia   US Follow-Ups: None  Further Fetal Surveillance: Beginning at 32 weeks (NST twice weekly + BHUPENDRA once a week)    LABS  Lab Results   Component Value Date/Time    HGBA1C 5.6 2024 07:27 AM "     Active Orders (needing to be collected):  None    Wendy Caceres RD   Diabetes and Pregnancy Program   Maternal Fetal Medicine  [facility]

## 2024-11-29 ENCOUNTER — TREATMENT (OUTPATIENT)
Dept: PERINATAL CARE | Facility: CLINIC | Age: 34
End: 2024-11-29

## 2024-11-29 DIAGNOSIS — O24.414 INSULIN CONTROLLED GESTATIONAL DIABETES MELLITUS (GDM) IN THIRD TRIMESTER: Primary | ICD-10-CM

## 2024-11-29 NOTE — PROGRESS NOTES
"Blood Sugar Log  Date: 24    Patient: Brittney Cisse  : 1990    Estimated Date of Delivery: 24  GA: 39w5d    Reason for Documentation: Blood Glucose Log     ASSESSMENT - REVIEW OF BG LOG         Assessment:  FBG: Well Controlled (<90mg/dl)  PPBG: Few Elevated (>120mg/dl) - Not Consistently Elevated    PLAN     MEDICATIONS:     Lantus f continue 64 units once daily at bedtime (9-10pm); split into two separate equal injections of 32 units each    DIET: Continue Assigned Meal Plan (including 3 meals and 3 snacks): 2000 calorie (CHO: 45-15-60-15-60-30) (PRO: 2/3-1-3/4-1-3/4-2)    BLOOD SUGAR MONITORING: Continue Testing B x per day (Fasting, 2 hour after start of each meal) - (Glucometer: Contour Next EZ)     PHYSICAL ACTIVITY: Continue walking (or other preferred physical activity) daily - recommend at least 20-30 minutes daily, preferably after dinner if able (unless otherwise instructed per OB)     MONITORING     EDUCATION: (Diabetes and Pregnancy Program)    Completed: Class 1 (Pt Goal: \"I will eat 3 meals and 3 snacks each day, including protein at each\"), Class 2, and Insulin Education  Goal Achieved: On track    Needs Scheduled: None at this time, Follow-ups to be scheduled as indicated per weekly review of blood sugar logs    WEIGHT:     Pre-Gravid Wt Pre-Gravid BMI TWG   102 kg (224 lb) 39.69 9.435 kg (20 lb 12.8 oz)     FETAL MONITORITNG - ULTRASOUNDS  Recent Ultrasounds Findings: (24; 37w1d) AC: >99%, EFW: 97%; NML BHUPENDRA; Growth assessment indicated possible macrosomia   US Follow-Ups: None  Further Fetal Surveillance: Beginning at 32 weeks (NST twice weekly + BHUPENDRA once a week)    LABS  Lab Results   Component Value Date/Time    HGBA1C 5.6 2024 07:27 AM     Active Orders (needing to be collected):  None    Diabetes outside Support System: Spouse/Family    Date to rport next: in 1 week if still pregnant.     Stephanie Echeverria , MS, RD, [facility]  Diabetes and Pregnancy Program "   Maternal Fetal Medicine  [facility]

## 2024-12-05 DIAGNOSIS — Z13.1 DIABETES MELLITUS SCREENING: Primary | ICD-10-CM

## 2024-12-05 DIAGNOSIS — Z86.32 HISTORY OF GESTATIONAL DIABETES: ICD-10-CM

## 2025-01-14 ENCOUNTER — HOSPITAL ENCOUNTER (EMERGENCY)
Facility: HOSPITAL | Age: 35
Discharge: HOME | End: 2025-01-14
Attending: EMERGENCY MEDICINE
Payer: COMMERCIAL

## 2025-01-14 VITALS
RESPIRATION RATE: 18 BRPM | OXYGEN SATURATION: 96 % | DIASTOLIC BLOOD PRESSURE: 65 MMHG | SYSTOLIC BLOOD PRESSURE: 140 MMHG | TEMPERATURE: 97.2 F | HEART RATE: 72 BPM

## 2025-01-14 DIAGNOSIS — R51.9 HEADACHE: Primary | ICD-10-CM

## 2025-01-14 LAB
BACTERIA UR QL AUTO: ABNORMAL /HPF
BILIRUB UR QL STRIP: NEGATIVE
CLARITY UR: ABNORMAL
COLOR UR: YELLOW
EXT PREGNANCY TEST URINE: NEGATIVE
EXT. CONTROL: NORMAL
GLUCOSE UR STRIP-MCNC: NEGATIVE MG/DL
HGB UR QL STRIP.AUTO: NEGATIVE
KETONES UR STRIP-MCNC: NEGATIVE MG/DL
LEUKOCYTE ESTERASE UR QL STRIP: ABNORMAL
NITRITE UR QL STRIP: NEGATIVE
NON-SQ EPI CELLS URNS QL MICRO: ABNORMAL /HPF
PH UR STRIP.AUTO: 6 [PH]
PROT UR STRIP-MCNC: ABNORMAL MG/DL
RBC #/AREA URNS AUTO: ABNORMAL /HPF
SP GR UR STRIP.AUTO: >=1.03 (ref 1–1.03)
UROBILINOGEN UR STRIP-ACNC: <2 MG/DL
WBC #/AREA URNS AUTO: ABNORMAL /HPF

## 2025-01-14 PROCEDURE — 81001 URINALYSIS AUTO W/SCOPE: CPT

## 2025-01-14 PROCEDURE — 96375 TX/PRO/DX INJ NEW DRUG ADDON: CPT

## 2025-01-14 PROCEDURE — 99283 EMERGENCY DEPT VISIT LOW MDM: CPT

## 2025-01-14 PROCEDURE — 96365 THER/PROPH/DIAG IV INF INIT: CPT

## 2025-01-14 PROCEDURE — 87086 URINE CULTURE/COLONY COUNT: CPT

## 2025-01-14 PROCEDURE — 99284 EMERGENCY DEPT VISIT MOD MDM: CPT | Performed by: PHYSICIAN ASSISTANT

## 2025-01-14 PROCEDURE — 96361 HYDRATE IV INFUSION ADD-ON: CPT

## 2025-01-14 PROCEDURE — 81025 URINE PREGNANCY TEST: CPT

## 2025-01-14 RX ORDER — MAGNESIUM SULFATE HEPTAHYDRATE 40 MG/ML
2 INJECTION, SOLUTION INTRAVENOUS ONCE
Status: COMPLETED | OUTPATIENT
Start: 2025-01-14 | End: 2025-01-14

## 2025-01-14 RX ORDER — METOCLOPRAMIDE HYDROCHLORIDE 5 MG/ML
10 INJECTION INTRAMUSCULAR; INTRAVENOUS ONCE
Status: COMPLETED | OUTPATIENT
Start: 2025-01-14 | End: 2025-01-14

## 2025-01-14 RX ORDER — KETOROLAC TROMETHAMINE 30 MG/ML
30 INJECTION, SOLUTION INTRAMUSCULAR; INTRAVENOUS ONCE
Status: COMPLETED | OUTPATIENT
Start: 2025-01-14 | End: 2025-01-14

## 2025-01-14 RX ORDER — DIPHENHYDRAMINE HYDROCHLORIDE 50 MG/ML
25 INJECTION INTRAMUSCULAR; INTRAVENOUS ONCE
Status: COMPLETED | OUTPATIENT
Start: 2025-01-14 | End: 2025-01-14

## 2025-01-14 RX ADMIN — METOCLOPRAMIDE 10 MG: 5 INJECTION, SOLUTION INTRAMUSCULAR; INTRAVENOUS at 20:24

## 2025-01-14 RX ADMIN — MAGNESIUM SULFATE HEPTAHYDRATE 2 G: 40 INJECTION, SOLUTION INTRAVENOUS at 21:01

## 2025-01-14 RX ADMIN — SODIUM CHLORIDE 1000 ML: 0.9 INJECTION, SOLUTION INTRAVENOUS at 20:24

## 2025-01-14 RX ADMIN — DIPHENHYDRAMINE HYDROCHLORIDE 25 MG: 50 INJECTION, SOLUTION INTRAMUSCULAR; INTRAVENOUS at 20:24

## 2025-01-14 RX ADMIN — KETOROLAC TROMETHAMINE 30 MG: 30 INJECTION, SOLUTION INTRAMUSCULAR; INTRAVENOUS at 20:24

## 2025-01-15 NOTE — DISCHARGE INSTRUCTIONS
Rest, increase fluids.  Tylenol/motrin for headaches.  Follow up with PCP in 1-2 days for recheck.  Return to ER if symptoms worsen.

## 2025-01-15 NOTE — ED PROVIDER NOTES
Time reflects when diagnosis was documented in both MDM as applicable and the Disposition within this note       Time User Action Codes Description Comment    1/14/2025  9:43 PM Falguni Lowery Add [R51.9] Headache           ED Disposition       ED Disposition   Discharge    Condition   Stable    Date/Time   Tue Jan 14, 2025  9:43 PM    Comment   Brittney Cisse discharge to home/self care.                   Assessment & Plan       Medical Decision Making  Patient with headache, no nausea, vomiting, neck pain or numbness or weakness.  Headaches is similar to prior headaches, will give migraine cocktail and reassess.  Patient improved with medications.  Will d/c with return precautions.  Patient with asymptomatic bacteria in urine, urine culture pending, since asymptomatic will hold off on abx, most likely contaminant.     Amount and/or Complexity of Data Reviewed  Labs:  Decision-making details documented in ED Course.    Risk  Prescription drug management.        ED Course as of 01/14/25 2149 Tue Jan 14, 2025 2141 Patient feeling much better, states she is ready to go home.    2142 WBC, UA(!): 10-20  Patient asymptomatic most likely not a clean catch, will hold off on abx.        Medications   magnesium sulfate 2 g/50 mL IVPB (premix) 2 g (2 g Intravenous New Bag 1/14/25 2101)   ketorolac (TORADOL) injection 30 mg (30 mg Intravenous Given 1/14/25 2024)   metoclopramide (REGLAN) injection 10 mg (10 mg Intravenous Given 1/14/25 2024)   diphenhydrAMINE (BENADRYL) injection 25 mg (25 mg Intravenous Given 1/14/25 2024)   sodium chloride 0.9 % bolus 1,000 mL (0 mL Intravenous Stopped 1/14/25 2149)       ED Risk Strat Scores                                              History of Present Illness       Chief Complaint   Patient presents with    Headache     Pt reports started with severe headache this AM. Pt reports hx of headaches. Pt reports sensitive to light and sounds at this time.       Past Medical  History:   Diagnosis Date    Chronic headaches     Gallstones without obstruction of gallbladder 2017    GDM, class A2     Gestational diabetes mellitus (GDM) controlled on oral hypoglycemic drug, antepartum 2022    And insulin      Gestational hypertension     Pt reports not accurate; States they were using the wrong size cuff      Past Surgical History:   Procedure Laterality Date     SECTION      CHOLECYSTECTOMY        Family History   Problem Relation Age of Onset    Gestational diabetes Mother         + Pre-DM    Hypertension Father     Asthma Brother     Diabetes type II Maternal Grandfather     No Known Problems Daughter       Social History     Tobacco Use    Smoking status: Never    Smokeless tobacco: Never   Vaping Use    Vaping status: Never Used   Substance Use Topics    Alcohol use: Not Currently    Drug use: Never      E-Cigarette/Vaping    E-Cigarette Use Never User       E-Cigarette/Vaping Substances      I have reviewed and agree with the history as documented.       Headache  Associated symptoms: photophobia    Associated symptoms: no abdominal pain, no cough, no dizziness, no fever, no nausea, no neck pain, no neck stiffness, no numbness, no vomiting and no weakness      Patient is a 33 y/o F that presents to the ED with generalized headache that started this morning.  She states she gets headaches off and on and usually takes excedrin and it goes away.  She states last time she had a headache this bad was when she was pregnant a few months ago. She denies nausea, vomiting.  No numbness or weakness.  She took tylenol today, but it didn't help.  Pain is 9/10.  No recent illness, fevers/chills. Patient is currently breastfeeding.     Review of Systems   Constitutional:  Negative for chills and fever.   Eyes:  Positive for photophobia. Negative for visual disturbance.   Respiratory:  Negative for cough and shortness of breath.    Cardiovascular:  Negative for chest pain.    Gastrointestinal:  Negative for abdominal pain, nausea and vomiting.   Musculoskeletal:  Negative for neck pain and neck stiffness.   Skin:  Negative for color change, pallor and rash.   Neurological:  Positive for headaches. Negative for dizziness, facial asymmetry, speech difficulty, weakness, light-headedness and numbness.   Psychiatric/Behavioral:  Negative for confusion and decreased concentration.    All other systems reviewed and are negative.          Objective       ED Triage Vitals   Temperature Pulse Blood Pressure Respirations SpO2 Patient Position - Orthostatic VS   01/14/25 1751 01/14/25 1751 01/14/25 1752 01/14/25 1751 01/14/25 1751 01/14/25 1751   (!) 97.2 °F (36.2 °C) 74 (!) 190/100 16 98 % Sitting      Temp Source Heart Rate Source BP Location FiO2 (%) Pain Score    01/14/25 1751 01/14/25 1751 01/14/25 1751 -- 01/14/25 1751    Temporal Monitor Right arm  10 - Worst Possible Pain      Vitals      Date and Time Temp Pulse SpO2 Resp BP Pain Score FACES Pain Rating User   01/14/25 2100 -- 72 96 % 18 140/65 -- -- MG   01/14/25 2045 -- 74 96 % 18 141/89 -- -- MG   01/14/25 2024 -- -- -- -- -- 9 -- MG   01/14/25 2015 -- -- -- -- -- 9 -- MG   01/14/25 1752 -- -- -- -- 190/100 -- -- CM   01/14/25 1751 97.2 °F (36.2 °C) 74 98 % 16 -- 10 - Worst Possible Pain -- CM            Physical Exam  Vitals and nursing note reviewed.   Constitutional:       General: She is not in acute distress.     Appearance: Normal appearance. She is well-developed and well-groomed. She is not ill-appearing or diaphoretic.   HENT:      Head: Normocephalic and atraumatic.      Right Ear: Tympanic membrane normal.      Left Ear: Tympanic membrane normal.      Nose: Nose normal.      Mouth/Throat:      Mouth: Mucous membranes are moist.      Pharynx: Oropharynx is clear.   Eyes:      General: No visual field deficit.     Extraocular Movements: Extraocular movements intact.      Conjunctiva/sclera: Conjunctivae normal.      Pupils:  Pupils are equal, round, and reactive to light.   Cardiovascular:      Rate and Rhythm: Normal rate and regular rhythm.      Heart sounds: Normal heart sounds.   Pulmonary:      Effort: Pulmonary effort is normal.      Breath sounds: Normal breath sounds. No wheezing, rhonchi or rales.   Musculoskeletal:         General: Normal range of motion.      Cervical back: Normal range of motion and neck supple.      Right lower leg: No edema.      Left lower leg: No edema.   Skin:     General: Skin is warm and dry.      Coloration: Skin is not pale.      Findings: No rash.   Neurological:      General: No focal deficit present.      Mental Status: She is alert and oriented to person, place, and time.      GCS: GCS eye subscore is 4. GCS verbal subscore is 5. GCS motor subscore is 6.      Cranial Nerves: Cranial nerves 2-12 are intact. No cranial nerve deficit, dysarthria or facial asymmetry.      Sensory: Sensation is intact. No sensory deficit.      Motor: Motor function is intact. No weakness.      Gait: Gait is intact.   Psychiatric:         Behavior: Behavior is cooperative.         Results Reviewed       Procedure Component Value Units Date/Time    UA w Reflex to Microscopic w Reflex to Culture [[id number]]  (Abnormal) Collected: 01/14/25 2008    Lab Status: Final result Specimen: Urine, Clean Catch Updated: 01/14/25 2050     Color, UA Yellow     Clarity, UA Slightly Cloudy     Specific Gravity, UA >=1.030     pH, UA 6.0     Leukocytes, UA Moderate     Nitrite, UA Negative     Protein, UA Trace mg/dl      Glucose, UA Negative mg/dl      Ketones, UA Negative mg/dl      Urobilinogen, UA <2.0 mg/dl      Bilirubin, UA Negative     Occult Blood, UA Negative     URINE COMMENT --    Urine culture [[id number]] Collected: 01/14/25 2008    Lab Status: In process Specimen: Urine, Clean Catch Updated: 01/14/25 2050    Urine Microscopic [[id number]]  (Abnormal) Collected: 01/14/25 2008    Lab Status: Final result Specimen: Urine,  Clean Catch Updated: 01/14/25 2050     RBC, UA None Seen /hpf      WBC, UA 10-20 /hpf      Epithelial Cells Moderate /hpf      Bacteria, UA None Seen /hpf     POCT pregnancy, urine [[id number]]  (Normal) Collected: 01/14/25 2023    Lab Status: Final result Updated: 01/14/25 2023     EXT Preg Test, Ur Negative     Control Valid            No orders to display       Procedures    ED Medication and Procedure Management   Prior to Admission Medications   Prescriptions Last Dose Informant Patient Reported? Taking?   ASPIRIN 81 PO   Yes No   Sig: Take 2 tablets by mouth in the morning   Acetaminophen (TYLENOL PO)   Yes No   Sig: Take by mouth   Insulin Pen Needle 31G X 5 MM MISC   No No   Sig: Use with insulin pen once daily at bedtime   Lantus SoloStar 100 units/mL SOPN   No No   Sig: Inject 0.6 mL (60 Units total) under the skin daily at bedtime At 9-10 PM daily. Titrate as directed. GDM.   Patient taking differently: Inject 64 Units under the skin daily at bedtime At 9-10 PM daily. Titrate as directed. GDM.   Microlet Lancets MISC   No No   Sig: Use 4 a Day or as instructed   Prenatal Vit-Fe Fumarate-FA (PRENATAL VITAMIN PO)   Yes No   Sig: Take 1 tablet by mouth in the morning   RIBOFLAVIN PO   Yes No   Sig: Take 1 tablet by mouth in the morning   magnesium 30 MG tablet   Yes No   Sig: Take 30 mg by mouth in the morning      Facility-Administered Medications: None     Patient's Medications   Discharge Prescriptions    No medications on file     No discharge procedures on file.  ED SEPSIS DOCUMENTATION   Time reflects when diagnosis was documented in both MDM as applicable and the Disposition within this note       Time User Action Codes Description Comment    1/14/2025  9:43 PM Falguni Lowery Add [R51.9] Headache                  Falguni Lowery PA-C  01/14/25 [id number]

## 2025-01-16 LAB — BACTERIA UR CULT: NORMAL

## 2025-07-17 ENCOUNTER — TELEPHONE (OUTPATIENT)
Age: 35
End: 2025-07-17

## 2025-07-17 NOTE — TELEPHONE ENCOUNTER
Patient called in and wanted to schedule a New Patient appt for Daily Headaches.  Pt is now scheduled 8/27/25 at 3:00 pm with Patti Bear- [facility] office.  Pt is on wait list.